# Patient Record
Sex: MALE
[De-identification: names, ages, dates, MRNs, and addresses within clinical notes are randomized per-mention and may not be internally consistent; named-entity substitution may affect disease eponyms.]

---
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
## 2019-01-01 ENCOUNTER — EXTERNAL RECORD (OUTPATIENT)
Dept: HEALTH INFORMATION MANAGEMENT | Facility: OTHER | Age: 19
End: 2019-01-01

## 2019-01-02 ENCOUNTER — PATIENT OUTREACH (OUTPATIENT)
Dept: CASE MANAGEMENT | Age: 19
End: 2019-01-02

## 2019-01-02 NOTE — PROGRESS NOTES
LM on pt ceel phone [phone] and asked him to call me back for [facility] call.

## 2019-03-05 ENCOUNTER — TELEPHONE (OUTPATIENT)
Dept: FAMILY MEDICINE CLINIC | Facility: CLINIC | Age: 19
End: 2019-03-05

## 2019-03-06 NOTE — TELEPHONE ENCOUNTER
Future Appointments   Date Time Provider Dieudonne Mandujano   3/16/2019  8:45 AM [facility] [place] NURSE [facility] [place]   4/12/2019 11:30 AM Mike Crawford MD [address]     Please advise. Pt due for a full set of labs at this time?

## 2019-03-15 ENCOUNTER — TELEPHONE (OUTPATIENT)
Dept: FAMILY MEDICINE CLINIC | Facility: CLINIC | Age: 19
End: 2019-03-15

## 2019-03-15 DIAGNOSIS — Z00.129 WELL ADOLESCENT VISIT: Primary | ICD-10-CM

## 2019-03-15 NOTE — TELEPHONE ENCOUNTER
Your Appointments    Saturday March 16, 2019  8:45 AM CDT  Nurse Only with [facility] [address] [place] [address] ([address]) [address] [place] [address],[address] [place] [address]   Friday April 12, 2019 11:30 AM C

## 2019-03-16 ENCOUNTER — NURSE ONLY (OUTPATIENT)
Dept: FAMILY MEDICINE CLINIC | Facility: CLINIC | Age: 19
End: 2019-03-16

## 2019-03-16 DIAGNOSIS — Z00.129 WELL ADOLESCENT VISIT: ICD-10-CM

## 2019-03-16 LAB
ALBUMIN SERPL-MCNC: 3.8 G/DL (ref 3.4–5)
ALBUMIN/GLOB SERPL: 1 {RATIO} (ref 1–2)
ALP LIVER SERPL-CCNC: 100 U/L (ref 45–117)
ALT SERPL-CCNC: 35 U/L (ref 16–61)
ANION GAP SERPL CALC-SCNC: 7 MMOL/L (ref 0–18)
AST SERPL-CCNC: 21 U/L (ref 15–37)
BASOPHILS # BLD AUTO: 0.03 X10(3) UL (ref 0–0.2)
BASOPHILS NFR BLD AUTO: 0.4 %
BILIRUB SERPL-MCNC: 0.4 MG/DL (ref 0.1–2)
BUN BLD-MCNC: 9 MG/DL (ref 7–18)
BUN/CREAT SERPL: 12.2 (ref 10–20)
CALCIUM BLD-MCNC: 8.8 MG/DL (ref 8.5–10.1)
CHLORIDE SERPL-SCNC: 104 MMOL/L (ref 98–107)
CHOLEST SMN-MCNC: 167 MG/DL (ref ?–200)
CO2 SERPL-SCNC: 26 MMOL/L (ref 21–32)
CREAT BLD-MCNC: 0.74 MG/DL (ref 0.7–1.3)
DEPRECATED RDW RBC AUTO: 39.4 FL (ref 35.1–46.3)
EOSINOPHIL # BLD AUTO: 0.15 X10(3) UL (ref 0–0.7)
EOSINOPHIL NFR BLD AUTO: 2.2 %
ERYTHROCYTE [DISTWIDTH] IN BLOOD BY AUTOMATED COUNT: 12.4 % (ref 11–15)
GLOBULIN PLAS-MCNC: 3.9 G/DL (ref 2.8–4.4)
GLUCOSE BLD-MCNC: 111 MG/DL (ref 70–99)
HCT VFR BLD AUTO: 40.4 % (ref 39–53)
HDLC SERPL-MCNC: 21 MG/DL (ref 40–59)
HGB BLD-MCNC: 13.4 G/DL (ref 13–17.5)
IMM GRANULOCYTES # BLD AUTO: 0.07 X10(3) UL (ref 0–1)
IMM GRANULOCYTES NFR BLD: 1 %
LDLC SERPL DIRECT ASSAY-MCNC: 89 MG/DL (ref ?–100)
LYMPHOCYTES # BLD AUTO: 1.97 X10(3) UL (ref 1.5–5)
LYMPHOCYTES NFR BLD AUTO: 29 %
M PROTEIN MFR SERPL ELPH: 7.7 G/DL (ref 6.4–8.2)
MCH RBC QN AUTO: 28.9 PG (ref 26–34)
MCHC RBC AUTO-ENTMCNC: 33.2 G/DL (ref 31–37)
MCV RBC AUTO: 87.1 FL (ref 80–100)
MONOCYTES # BLD AUTO: 0.58 X10(3) UL (ref 0.1–1)
MONOCYTES NFR BLD AUTO: 8.5 %
NEUTROPHILS # BLD AUTO: 4 X10 (3) UL (ref 1.5–7.7)
NEUTROPHILS # BLD AUTO: 4 X10(3) UL (ref 1.5–7.7)
NEUTROPHILS NFR BLD AUTO: 58.9 %
NONHDLC SERPL-MCNC: 146 MG/DL (ref ?–130)
OSMOLALITY SERPL CALC.SUM OF ELEC: 283 MOSM/KG (ref 275–295)
PLATELET # BLD AUTO: 151 10(3)UL (ref 150–450)
POTASSIUM SERPL-SCNC: 3.7 MMOL/L (ref 3.5–5.1)
RBC # BLD AUTO: 4.64 X10(6)UL (ref 4.3–5.7)
SODIUM SERPL-SCNC: 137 MMOL/L (ref 136–145)
TRIGL SERPL-MCNC: 722 MG/DL (ref 30–149)
TSI SER-ACNC: 7.89 MIU/ML (ref 0.36–3.74)
VIT D+METAB SERPL-MCNC: 15.9 NG/ML (ref 30–100)
WBC # BLD AUTO: 6.8 X10(3) UL (ref 4–11)

## 2019-03-16 PROCEDURE — 83721 ASSAY OF BLOOD LIPOPROTEIN: CPT | Performed by: FAMILY MEDICINE

## 2019-03-16 PROCEDURE — 36415 COLL VENOUS BLD VENIPUNCTURE: CPT | Performed by: FAMILY MEDICINE

## 2019-03-16 PROCEDURE — 80061 LIPID PANEL: CPT | Performed by: FAMILY MEDICINE

## 2019-03-16 PROCEDURE — 84443 ASSAY THYROID STIM HORMONE: CPT | Performed by: FAMILY MEDICINE

## 2019-03-16 PROCEDURE — 85025 COMPLETE CBC W/AUTO DIFF WBC: CPT | Performed by: FAMILY MEDICINE

## 2019-03-16 PROCEDURE — 82306 VITAMIN D 25 HYDROXY: CPT | Performed by: FAMILY MEDICINE

## 2019-03-16 PROCEDURE — 80053 COMPREHEN METABOLIC PANEL: CPT | Performed by: FAMILY MEDICINE

## 2019-03-18 ENCOUNTER — TELEPHONE (OUTPATIENT)
Dept: FAMILY MEDICINE CLINIC | Facility: CLINIC | Age: 19
End: 2019-03-18

## 2019-03-18 NOTE — TELEPHONE ENCOUNTER
Pt's mom called stated his lab results have been released to My Chart but they have not received a call.

## 2019-03-18 NOTE — TELEPHONE ENCOUNTER
Patient's mother advised and verbalized understanding. Appointment scheduled.   Future Appointments   Date Time Provider Dieudonne Nazia   3/21/2019  1:40 PM Rosalio Echavarria DO [facility]   4/12/2019 11:30 AM Daksha Stewart  [address]

## 2019-03-28 ENCOUNTER — OFFICE VISIT (OUTPATIENT)
Dept: FAMILY MEDICINE CLINIC | Facility: CLINIC | Age: 19
End: 2019-03-28

## 2019-03-28 VITALS
TEMPERATURE: 98 F | RESPIRATION RATE: 20 BRPM | OXYGEN SATURATION: 98 % | DIASTOLIC BLOOD PRESSURE: 75 MMHG | BODY MASS INDEX: 34 KG/M2 | HEART RATE: 76 BPM | SYSTOLIC BLOOD PRESSURE: 111 MMHG | WEIGHT: 196.38 LBS

## 2019-03-28 DIAGNOSIS — R73.09 ABNORMAL GLUCOSE: Primary | ICD-10-CM

## 2019-03-28 DIAGNOSIS — R10.811 RIGHT UPPER QUADRANT ABDOMINAL TENDERNESS WITHOUT REBOUND TENDERNESS: ICD-10-CM

## 2019-03-28 DIAGNOSIS — R94.6 ABNORMAL THYROID FUNCTION TEST: ICD-10-CM

## 2019-03-28 DIAGNOSIS — E78.1 HYPERTRIGLYCERIDEMIA: ICD-10-CM

## 2019-03-28 LAB
EST. AVERAGE GLUCOSE BLD GHB EST-MCNC: 108 MG/DL (ref 68–126)
HBA1C MFR BLD HPLC: 5.4 % (ref ?–5.7)
T4 FREE SERPL-MCNC: 0.8 NG/DL (ref 0.9–1.6)
T4 SERPL-MCNC: 7.3 UG/DL (ref 4.5–12.1)
THYROGLOB SERPL-MCNC: <15 U/ML (ref ?–60)
THYROPEROXIDASE AB SERPL-ACNC: 36 U/ML (ref ?–60)
TSI SER-ACNC: 1.51 MIU/ML (ref 0.36–3.74)

## 2019-03-28 PROCEDURE — 86376 MICROSOMAL ANTIBODY EACH: CPT | Performed by: FAMILY MEDICINE

## 2019-03-28 PROCEDURE — 84443 ASSAY THYROID STIM HORMONE: CPT | Performed by: FAMILY MEDICINE

## 2019-03-28 PROCEDURE — 83036 HEMOGLOBIN GLYCOSYLATED A1C: CPT | Performed by: FAMILY MEDICINE

## 2019-03-28 PROCEDURE — 99213 OFFICE O/P EST LOW 20 MIN: CPT | Performed by: FAMILY MEDICINE

## 2019-03-28 PROCEDURE — 36415 COLL VENOUS BLD VENIPUNCTURE: CPT | Performed by: FAMILY MEDICINE

## 2019-03-28 PROCEDURE — 84439 ASSAY OF FREE THYROXINE: CPT | Performed by: FAMILY MEDICINE

## 2019-03-28 PROCEDURE — 86800 THYROGLOBULIN ANTIBODY: CPT | Performed by: FAMILY MEDICINE

## 2019-03-28 NOTE — PROGRESS NOTES
CC: follow up - abnormal labs    HPI:     The glucose elevated. TG's markedly elevated. He is drinking a lot of pop.     Thyroid studies abnormal.     ROS:     SKIN: denies any unusual skin lesions or rashes  RESPIRATORY: denies shortness of breath with

## 2019-05-06 ENCOUNTER — OFFICE VISIT (OUTPATIENT)
Dept: FAMILY MEDICINE | Age: 19
End: 2019-05-06

## 2019-05-06 VITALS
SYSTOLIC BLOOD PRESSURE: 118 MMHG | WEIGHT: 196 LBS | HEIGHT: 62 IN | RESPIRATION RATE: 16 BRPM | DIASTOLIC BLOOD PRESSURE: 80 MMHG | HEART RATE: 88 BPM | BODY MASS INDEX: 36.07 KG/M2 | OXYGEN SATURATION: 98 %

## 2019-05-06 DIAGNOSIS — E88.819 INSULIN RESISTANCE: ICD-10-CM

## 2019-05-06 DIAGNOSIS — E61.1 IRON DEFICIENCY: ICD-10-CM

## 2019-05-06 DIAGNOSIS — E03.8 SUBCLINICAL HYPOTHYROIDISM: ICD-10-CM

## 2019-05-06 DIAGNOSIS — Z91.89 RISK OF EXPOSURE TO LYME DISEASE: ICD-10-CM

## 2019-05-06 DIAGNOSIS — E61.2 MAGNESIUM DEFICIENCY: ICD-10-CM

## 2019-05-06 DIAGNOSIS — E55.9 VITAMIN D DEFICIENCY: ICD-10-CM

## 2019-05-06 DIAGNOSIS — E53.8 B12 DEFICIENCY: ICD-10-CM

## 2019-05-06 DIAGNOSIS — R41.82 ALTERED MENTAL STATUS, UNSPECIFIED ALTERED MENTAL STATUS TYPE: Primary | ICD-10-CM

## 2019-05-06 PROCEDURE — 99213 OFFICE O/P EST LOW 20 MIN: CPT | Performed by: FAMILY MEDICINE

## 2019-05-06 ASSESSMENT — PATIENT HEALTH QUESTIONNAIRE - PHQ9
1. LITTLE INTEREST OR PLEASURE IN DOING THINGS: NOT AT ALL
SUM OF ALL RESPONSES TO PHQ9 QUESTIONS 1 AND 2: 0
SUM OF ALL RESPONSES TO PHQ9 QUESTIONS 1 AND 2: 0
2. FEELING DOWN, DEPRESSED OR HOPELESS: NOT AT ALL

## 2019-05-06 ASSESSMENT — ENCOUNTER SYMPTOMS
HEMATOLOGIC/LYMPHATIC NEGATIVE: 1
PSYCHIATRIC NEGATIVE: 1
ALLERGIC/IMMUNOLOGIC NEGATIVE: 1
GASTROINTESTINAL NEGATIVE: 1
EYES NEGATIVE: 1
ENDOCRINE NEGATIVE: 1
RESPIRATORY NEGATIVE: 1
CONSTITUTIONAL NEGATIVE: 1
NEUROLOGICAL NEGATIVE: 1

## 2019-05-11 ENCOUNTER — TELEPHONE (OUTPATIENT)
Dept: FAMILY MEDICINE | Age: 19
End: 2019-05-11

## 2019-05-11 ENCOUNTER — LAB SERVICES (OUTPATIENT)
Dept: LAB | Age: 19
End: 2019-05-11

## 2019-05-11 DIAGNOSIS — Z91.89 RISK OF EXPOSURE TO LYME DISEASE: ICD-10-CM

## 2019-05-11 DIAGNOSIS — E61.2 MAGNESIUM DEFICIENCY: ICD-10-CM

## 2019-05-11 DIAGNOSIS — R41.82 ALTERED MENTAL STATUS, UNSPECIFIED ALTERED MENTAL STATUS TYPE: Primary | ICD-10-CM

## 2019-05-11 DIAGNOSIS — E03.8 SUBCLINICAL HYPOTHYROIDISM: ICD-10-CM

## 2019-05-11 DIAGNOSIS — E88.819 INSULIN RESISTANCE: ICD-10-CM

## 2019-05-11 DIAGNOSIS — R46.89 BEHAVIORAL CHANGE: Primary | ICD-10-CM

## 2019-05-11 DIAGNOSIS — E61.1 IRON DEFICIENCY: ICD-10-CM

## 2019-05-11 DIAGNOSIS — E53.8 VITAMIN B12 DEFICIENCY: ICD-10-CM

## 2019-05-11 DIAGNOSIS — E55.9 VITAMIN D DEFICIENCY: ICD-10-CM

## 2019-05-11 LAB
25(OH)D3 SERPL-MCNC: 21.6 NG/ML (ref 30–100)
IRON SATN MFR SERPL: 36 % (ref 13–59)
IRON SERPL-MCNC: 124 UG/DL (ref 49–181)
T3FREE SERPL-MCNC: 4.5 PG/ML (ref 2.8–5.3)
T4 FREE SERPL-MCNC: 0.72 NG/DL (ref 0.78–2.19)
TIBC SERPL-MCNC: 342 UG/DL (ref 250–450)
TSH SERPL DL<=0.05 MIU/L-ACNC: 2 M[IU]/L (ref 0.3–4.82)
VIT B12 SERPL-MCNC: 543 PG/ML (ref 193–982)

## 2019-05-11 PROCEDURE — 84443 ASSAY THYROID STIM HORMONE: CPT | Performed by: FAMILY MEDICINE

## 2019-05-11 PROCEDURE — 87798 DETECT AGENT NOS DNA AMP: CPT | Performed by: FAMILY MEDICINE

## 2019-05-11 PROCEDURE — 36415 COLL VENOUS BLD VENIPUNCTURE: CPT | Performed by: FAMILY MEDICINE

## 2019-05-11 PROCEDURE — 83550 IRON BINDING TEST: CPT | Performed by: FAMILY MEDICINE

## 2019-05-11 PROCEDURE — 83525 ASSAY OF INSULIN: CPT | Performed by: FAMILY MEDICINE

## 2019-05-11 PROCEDURE — 84439 ASSAY OF FREE THYROXINE: CPT | Performed by: FAMILY MEDICINE

## 2019-05-11 PROCEDURE — 86617 LYME DISEASE ANTIBODY: CPT | Performed by: FAMILY MEDICINE

## 2019-05-11 PROCEDURE — 83735 ASSAY OF MAGNESIUM: CPT | Performed by: FAMILY MEDICINE

## 2019-05-11 PROCEDURE — 82306 VITAMIN D 25 HYDROXY: CPT | Performed by: FAMILY MEDICINE

## 2019-05-11 PROCEDURE — 82607 VITAMIN B-12: CPT | Performed by: FAMILY MEDICINE

## 2019-05-11 PROCEDURE — 86753 PROTOZOA ANTIBODY NOS: CPT | Performed by: FAMILY MEDICINE

## 2019-05-11 PROCEDURE — 83540 ASSAY OF IRON: CPT | Performed by: FAMILY MEDICINE

## 2019-05-11 PROCEDURE — 87471 BARTONELLA DNA AMP PROBE: CPT | Performed by: FAMILY MEDICINE

## 2019-05-11 PROCEDURE — 84481 FREE ASSAY (FT-3): CPT | Performed by: FAMILY MEDICINE

## 2019-05-12 LAB — INSULIN SERPL-ACNC: 56 MUNITS/L

## 2019-05-13 LAB — FAX RESULTS: NORMAL

## 2019-05-14 LAB — MAGNESIUM RBC-MCNC: 5.3 MG/DL (ref 4–6.5)

## 2019-05-15 LAB
A PHAGOCYTOPH DNA BLD QL NAA+PROBE: NEGATIVE
E CHAFFEENSIS DNA BLD QL NAA+PROBE: NEGATIVE
E EWINGII DNA SPEC QL NAA+PROBE: NEGATIVE
EHRLICHIA DNA SPEC QL NAA+PROBE: NEGATIVE

## 2019-05-16 LAB
B BURGDOR IGG PATRN SER IB-IMP: NORMAL
B BURGDOR IGG SER QL IB: NEGATIVE
B BURGDOR IGG+IGM SER IB-IMP: NORMAL
B BURGDOR IGM PATRN SER IB-IMP: NORMAL
B BURGDOR IGM SER QL IB: NEGATIVE
B MICROTI IGG TITR SER: NORMAL {TITER}
B MICROTI IGM TITR SER: NORMAL {TITER}

## 2019-05-17 ENCOUNTER — TELEPHONE (OUTPATIENT)
Dept: INTERNAL MEDICINE | Age: 19
End: 2019-05-17

## 2019-05-20 DIAGNOSIS — E88.819 INSULIN RESISTANCE: Primary | ICD-10-CM

## 2019-05-20 DIAGNOSIS — R79.89 LOW T4: ICD-10-CM

## 2019-05-20 DIAGNOSIS — E61.2 MAGNESIUM DEFICIENCY: ICD-10-CM

## 2019-05-20 DIAGNOSIS — R73.09 ELEVATED GLYCOHEMOGLOBIN: ICD-10-CM

## 2019-05-20 DIAGNOSIS — E03.8 SUBCLINICAL HYPOTHYROIDISM: ICD-10-CM

## 2019-05-21 LAB
MISCELLANEOUS #1: NORMAL
MISCELLANEOUS #1: NORMAL

## 2019-07-08 ENCOUNTER — TELEPHONE (OUTPATIENT)
Dept: FAMILY MEDICINE CLINIC | Facility: CLINIC | Age: 19
End: 2019-07-08

## 2019-07-08 DIAGNOSIS — M21.41 PES PLANUS OF BOTH FEET: Primary | ICD-10-CM

## 2019-07-08 DIAGNOSIS — M21.42 PES PLANUS OF BOTH FEET: Primary | ICD-10-CM

## 2019-07-09 ENCOUNTER — TELEPHONE (OUTPATIENT)
Dept: PODIATRY CLINIC | Facility: CLINIC | Age: 19
End: 2019-07-09

## 2019-07-09 NOTE — TELEPHONE ENCOUNTER
See if the patient can come in tomorrow so I can tell you if not see if they can see Dr. Jessee Perez or possibly Dr. Cait Lopez

## 2019-07-09 NOTE — TELEPHONE ENCOUNTER
Pts mother states pts left toe looks purple, states there is blood and it is oozing, states pt neglected to tell her about toe situation and is only getting worse, asking for appt soon, states she is familiar with [facility] because her other son is a pt of his,

## 2019-07-09 NOTE — TELEPHONE ENCOUNTER
RAIZATCLEDY on mother's phone. Advised that [facility] suggested appt tomorrow in [place].

## 2019-07-09 NOTE — TELEPHONE ENCOUNTER
LMTCB to clarify original message and ask f/u questions re toe and to schedule appt. WMN - see note and advise. First available appt is 7/17/19 in [place].  Based on current available information, would you like to see sooner (double book tomorrow in [facility]

## 2019-07-15 NOTE — TELEPHONE ENCOUNTER
S/w pt's mother. They have not gone to urgent care. Bilateral hallux bleeding, purple, swollen on pt. appt made for 7/16/19 1345 with [facility]. Patient repeated back time and location of appt, verbalized understanding of plan.

## 2019-07-16 ENCOUNTER — OFFICE VISIT (OUTPATIENT)
Dept: PODIATRY CLINIC | Facility: CLINIC | Age: 19
End: 2019-07-16

## 2019-07-16 DIAGNOSIS — M79.675 PAIN IN TOES OF BOTH FEET: ICD-10-CM

## 2019-07-16 DIAGNOSIS — L03.032 PARONYCHIA OF TOE OF LEFT FOOT: ICD-10-CM

## 2019-07-16 DIAGNOSIS — L60.0 ONYCHOCRYPTOSIS: Primary | ICD-10-CM

## 2019-07-16 DIAGNOSIS — M79.674 PAIN IN TOES OF BOTH FEET: ICD-10-CM

## 2019-07-16 DIAGNOSIS — L03.031 PARONYCHIA OF TOE OF RIGHT FOOT: ICD-10-CM

## 2019-07-16 PROCEDURE — 99203 OFFICE O/P NEW LOW 30 MIN: CPT | Performed by: PODIATRIST

## 2019-07-16 RX ORDER — AZITHROMYCIN 250 MG/1
TABLET, FILM COATED ORAL
Qty: 1 PACKAGE | Refills: 0 | Status: SHIPPED | OUTPATIENT
Start: 2019-07-16 | End: 2019-09-08

## 2019-07-16 NOTE — PROGRESS NOTES
Tony Aiken is a 22year old male. Patient presents with:  Ingrown Toenail: Bilateral big toes -has redness andf drainage, has pain with touch rated as 7-8/10         HPI:   This pleasant patient presents to the clinic his mother is present with him.   He Testicular cancer   • Anxiety Mother    • Depression Mother    • Anxiety Maternal Grandmother    • Depression Maternal Grandmother    • Depression Maternal Grandfather    • Anxiety Maternal Grandfather    • Anxiety Sister       Social History    Soc of both feet    Other orders  -     mupirocin 2 % External Ointment; Apply a small amount to the affected nail edge twice daily after soaking and cover with a Band-Aid,  -     azithromycin (ZITHROMAX Z-ROSI) 250 MG Oral Tab;  Take two tablets today to start

## 2019-07-22 ENCOUNTER — TELEPHONE (OUTPATIENT)
Dept: FAMILY MEDICINE CLINIC | Facility: CLINIC | Age: 19
End: 2019-07-22

## 2019-07-22 ENCOUNTER — TELEPHONE (OUTPATIENT)
Dept: PODIATRY CLINIC | Facility: CLINIC | Age: 19
End: 2019-07-22

## 2019-07-22 DIAGNOSIS — M21.41 PES PLANUS OF BOTH FEET: Primary | ICD-10-CM

## 2019-07-22 DIAGNOSIS — M21.42 PES PLANUS OF BOTH FEET: Primary | ICD-10-CM

## 2019-07-22 NOTE — TELEPHONE ENCOUNTER
Mom notified. I did let her know if the podiatry office has questions, they can certainly contact our office to discuss.

## 2019-07-22 NOTE — TELEPHONE ENCOUNTER
Pt has procedure tomorrow for ingrown toenail. Pt has HMO ins and PCP put in ref for diagnosis of flat foot. Pts mother asking if ref is okay.  pls advise thank you

## 2019-07-22 NOTE — TELEPHONE ENCOUNTER
LMTCB    Referral placed for Dr. Hola Shanks - it is up to podiatrist office to get procedure approved. Referral is placed for Office visit. As long as referral is placed their office may place referral for procedure.

## 2019-07-22 NOTE — TELEPHONE ENCOUNTER
Stated Dr. Liliane Euceda office having problem with referral.  Stated pt is having procedure for ingrown toe nail, not flat feet.

## 2019-07-22 NOTE — TELEPHONE ENCOUNTER
Spoke to mother of pt and pt is only being seen by [facility] for ingrown toenails. Advised mother to call PCP office to have referral changed to ingrown toenail for reason for visit. Mother verbalized understanding.

## 2019-07-22 NOTE — TELEPHONE ENCOUNTER
Pt's mom called stated her son is having a procedure tormorrow and they need 2 referrals. One for the procedure and one for the f/u. This is with Dr. Mark Lancaster.

## 2019-07-23 ENCOUNTER — OFFICE VISIT (OUTPATIENT)
Dept: PODIATRY CLINIC | Facility: CLINIC | Age: 19
End: 2019-07-23

## 2019-07-23 VITALS — HEART RATE: 68 BPM | DIASTOLIC BLOOD PRESSURE: 83 MMHG | SYSTOLIC BLOOD PRESSURE: 117 MMHG | RESPIRATION RATE: 16 BRPM

## 2019-07-23 DIAGNOSIS — L60.0 ONYCHOCRYPTOSIS: Primary | ICD-10-CM

## 2019-07-23 DIAGNOSIS — M79.674 PAIN IN TOES OF BOTH FEET: ICD-10-CM

## 2019-07-23 DIAGNOSIS — L03.031 PARONYCHIA OF TOE OF RIGHT FOOT: ICD-10-CM

## 2019-07-23 DIAGNOSIS — L03.032 PARONYCHIA OF TOE OF LEFT FOOT: ICD-10-CM

## 2019-07-23 DIAGNOSIS — M79.675 PAIN IN TOES OF BOTH FEET: ICD-10-CM

## 2019-07-23 PROCEDURE — 11750 EXCISION NAIL&NAIL MATRIX: CPT | Performed by: PODIATRIST

## 2019-07-23 RX ORDER — DOCUSATE SODIUM 100 MG/1
100 CAPSULE, LIQUID FILLED ORAL
Status: ON HOLD | COMMUNITY
Start: 2019-06-20 | End: 2021-08-23

## 2019-07-23 NOTE — PROGRESS NOTES
Per Dr. Mark Lancaster, draw up  Two syringes of 5 cc of 0.5 % Marcaine for injection to bilateral hallux.  KP

## 2019-07-23 NOTE — PROGRESS NOTES
Mark Durand is a 22year old male. Patient presents with:  Procedure: bilateral toenail procedure - pt denies toe pain today. HPI:   Patient returns the clinic with his mother present for toenail procedures.   At today's visit reviewed nurse's hist Mother    • Anxiety Maternal Grandmother    • Depression Maternal Grandmother    • Depression Maternal Grandfather    • Anxiety Maternal Grandfather    • Anxiety Sister       Social History    Socioeconomic History      Marital status: Single      Spouse n lateral nail border of the hallux bilateral feet    Technique: Appropriate timeout was taken. The hallux bilateral feet were anesthetized with 5 cc per hallux of 0.5% Marcaine plain then prepped and draped using usual aseptic technique.   Hemostasis was ac

## 2019-09-08 ENCOUNTER — HOSPITAL ENCOUNTER (OUTPATIENT)
Age: 19
Discharge: HOME | End: 2019-09-08
Attending: FAMILY MEDICINE
Payer: COMMERCIAL

## 2019-09-08 VITALS
TEMPERATURE: 100 F | OXYGEN SATURATION: 97 % | DIASTOLIC BLOOD PRESSURE: 79 MMHG | HEART RATE: 100 BPM | SYSTOLIC BLOOD PRESSURE: 125 MMHG | RESPIRATION RATE: 16 BRPM

## 2019-09-08 DIAGNOSIS — J01.90 ACUTE SINUSITIS, RECURRENCE NOT SPECIFIED, UNSPECIFIED LOCATION: Primary | ICD-10-CM

## 2019-09-08 DIAGNOSIS — J11.1 FLU SYNDROME: ICD-10-CM

## 2019-09-08 PROCEDURE — 99214 OFFICE O/P EST MOD 30 MIN: CPT

## 2019-09-08 PROCEDURE — 99213 OFFICE O/P EST LOW 20 MIN: CPT

## 2019-09-08 RX ORDER — AZITHROMYCIN 250 MG/1
TABLET, FILM COATED ORAL
Qty: 6 TABLET | Refills: 0 | Status: SHIPPED | OUTPATIENT
Start: 2019-09-08 | End: 2019-09-14

## 2019-09-08 NOTE — ED PROVIDER NOTES
Patient Seen in: [address] [facility]    History   Patient presents with:  Sinusitis    Stated Complaint: Sore Throat, Fever, congestion, cough    HPI    79-year-old male presents to the immediate care today with chief complaints of sinus pres (37.6 °C) (Temporal)   Resp 16   SpO2 97%         Physical Exam      General appearance: alert, appears stated age and cooperative. Moderately ill appearing. Head: Normocephalic, without obvious abnormality, atraumatic  Eyes: conjunctivae/corneas clear.  P

## 2019-09-08 NOTE — ED INITIAL ASSESSMENT (HPI)
Pt states he has had sinus congestion and a runny nose with post nasal drip for a few days. Last night had a fever, headache and a slight sore throat. States had to work all day and now feels run down and achy. Last tylenol at 0230.  States when he blows hi

## 2019-09-13 ENCOUNTER — TELEPHONE (OUTPATIENT)
Dept: FAMILY MEDICINE CLINIC | Facility: CLINIC | Age: 19
End: 2019-09-13

## 2019-09-13 NOTE — TELEPHONE ENCOUNTER
Mom has an appt tomorrow @ [phone],  Wants to know if she can bring pt with her? Pt was seen @ [facility] 5 days ago for Sinusitis,  Needs to f/u with PCP, still not feeling well. Please advise.

## 2019-09-13 NOTE — TELEPHONE ENCOUNTER
Patient's mother advised and verbalized understanding. Appointment scheduled.   Future Appointments   Date Time Provider Dieudonne Mandujano   9/14/2019 11:30 AM DO CASSY King [facility]

## 2019-09-13 NOTE — TELEPHONE ENCOUNTER
Mom requesting to bring patient to her appointment tomorrow at 11:45  No available openings  Patient see in [address] [place] [address] for sinusitis 9/8/19 - still not feeling better please advise.

## 2019-09-14 ENCOUNTER — OFFICE VISIT (OUTPATIENT)
Dept: FAMILY MEDICINE CLINIC | Facility: CLINIC | Age: 19
End: 2019-09-14

## 2019-09-14 VITALS
OXYGEN SATURATION: 96 % | TEMPERATURE: 98 F | DIASTOLIC BLOOD PRESSURE: 70 MMHG | SYSTOLIC BLOOD PRESSURE: 102 MMHG | BODY MASS INDEX: 34.49 KG/M2 | WEIGHT: 202 LBS | RESPIRATION RATE: 20 BRPM | HEIGHT: 64 IN | HEART RATE: 90 BPM

## 2019-09-14 DIAGNOSIS — R05.9 COUGH: Primary | ICD-10-CM

## 2019-09-14 PROCEDURE — 99213 OFFICE O/P EST LOW 20 MIN: CPT | Performed by: FAMILY MEDICINE

## 2019-09-16 NOTE — PROGRESS NOTES
CC: Cough    HPI:     Several days of cough. No sputum or hemoptysis. Recently treated with antibiotics. ROS: Initially had fever seems to have resolved.       Current Outpatient Medications:  Albuterol Sulfate 108 (90 Base) MCG/ACT Inhalation Aerosol Imaging & Consults:  None

## 2019-10-10 ENCOUNTER — HOSPITAL ENCOUNTER (OUTPATIENT)
Age: 19
Discharge: HOME | End: 2019-10-10
Attending: EMERGENCY MEDICINE
Payer: COMMERCIAL

## 2019-10-10 VITALS
RESPIRATION RATE: 20 BRPM | HEART RATE: 67 BPM | SYSTOLIC BLOOD PRESSURE: 125 MMHG | DIASTOLIC BLOOD PRESSURE: 80 MMHG | TEMPERATURE: 98 F | OXYGEN SATURATION: 98 %

## 2019-10-10 DIAGNOSIS — L02.91 CUTANEOUS ABSCESS, UNSPECIFIED SITE: Primary | ICD-10-CM

## 2019-10-10 PROCEDURE — 99212 OFFICE O/P EST SF 10 MIN: CPT

## 2019-10-10 NOTE — ED INITIAL ASSESSMENT (HPI)
Abscess to right groin area for 4 days,\"It's been rubbing a lot at work, increasing my pain, there is a little cyst there. \" no fevers.

## 2019-10-10 NOTE — ED PROVIDER NOTES
Patient Seen in: [address] [facility]      History   Patient presents with:  Abscess (integumentary)    Stated Complaint: groin area pain/lower back pain    HPI    Patient is a pleasant 24-year-old male presents with skin lesion to his right s Moist mucous membranes  Lungs: No tachypnea  Cardiac: No tachycardia  : There is a superficial folliculitis of the right scrotum. Skin: No rashes, pallor  Neuro: No focal deficits.   Extremities: No clubbing/cyanosis/edema    ED Course   Labs Reviewed -

## 2019-11-14 PROBLEM — E66.9 OBESITY: Status: ACTIVE | Noted: 2019-11-14

## 2019-11-14 PROBLEM — J45.909 ASTHMA (HCC): Status: ACTIVE | Noted: 2019-11-14

## 2019-11-14 PROBLEM — J45.909 ASTHMA: Status: ACTIVE | Noted: 2019-11-14

## 2019-11-14 NOTE — ED NOTES
Spoke to Anders at [facility], pt may be direct admit.  Petition and certificate faxed to [facility]

## 2019-11-14 NOTE — ED NOTES
Waiting for disposition from [facility], [facility] [place]

## 2019-11-14 NOTE — ED NOTES
Called [facility] again, informed accepted by Dr Duane Bathgate to room 823B.  Attempted to call report, no answer

## 2019-11-14 NOTE — ED PROVIDER NOTES
Patient Seen in: [facility] Emergency [facility] In [place]      History   Patient presents with:  Eval-P (psychiatric)    Stated Complaint: depression    HPI    Patient is a friendly 79-year-old male brought here by his father with concerns about suicida Cardiovascular: Normal rate and regular rhythm. Pulmonary/Chest: Effort normal and breath sounds normal. No stridor. Abdominal: Soft. There is no tenderness. There is no guarding. Musculoskeletal: Exhibits no edema or tenderness.      Neurologi

## 2019-11-14 NOTE — ED PROVIDER NOTES
Patient is a 26-year-old male with history of bipolar disorder who presented to the emergency department with concerns for suicidal ideation. Patient was initially evaluated by my partner, please refer to their documentation for additional details.     H

## 2019-11-14 NOTE — ED INITIAL ASSESSMENT (HPI)
States he feels depressed over the past few days after break up with girlfriend . Has been drinking Vodka today, + marijuana. Pt states he has suicidal thoughts with no definite plan and told his mother he needed help.  Has hx of bipolar and no meds because

## 2019-11-14 NOTE — ED NOTES
To [facility] via [facility] in stable condition with all belongings to medics.   Pt called mother prior to leaving

## 2019-11-19 ENCOUNTER — PATIENT OUTREACH (OUTPATIENT)
Dept: CASE MANAGEMENT | Age: 19
End: 2019-11-19

## 2019-11-19 NOTE — PROGRESS NOTES
Called patient's only # at [phone] to offer CM and one of his \"good friends\" answered. Told him I would call back.

## 2019-11-19 NOTE — PROGRESS NOTES
Danielaied calling patient at his # [phone]. A male voice answered but he denied being [place]. Stated it was one of his \"good friends. \" I told him I will call at a later time.

## 2019-11-20 NOTE — PROGRESS NOTES
Left voice mail on number listed below and asked him to call back for [facility] call.

## 2019-12-02 ENCOUNTER — TELEPHONE (OUTPATIENT)
Dept: FAMILY MEDICINE CLINIC | Facility: CLINIC | Age: 19
End: 2019-12-02

## 2019-12-02 NOTE — TELEPHONE ENCOUNTER
No appt scheduled with [facility]. Per pts mom, it will likely be 2 weeks before pt can see an NP. If pt needs to see a Psychiatrist, it will be January - March until he is seen. They are waiting on a CB from [facility] at this point.

## 2019-12-02 NOTE — TELEPHONE ENCOUNTER
Patient's mom advised. Verbalized understanding. States she is doing her best to get patient scheduled with MD or NP. States someone from [facility] is supposed to be calling her back.  States her son saw psych there are was not comfortable with the psychi

## 2019-12-02 NOTE — TELEPHONE ENCOUNTER
Call from patient's mom. Ok per [employer] form. Patient was d/c from [facility] about 2 weeks ago. Was told to f/u with Psych for refill on seroquel. States psych is booked until next year for appts. Trying to get patient in to see a NP at that office.  IN th

## 2020-01-28 PROBLEM — F12.20 CANNABIS USE DISORDER, SEVERE, DEPENDENCE (HCC): Status: ACTIVE | Noted: 2020-01-28

## 2020-01-28 PROBLEM — F10.20 ALCOHOL USE DISORDER, MODERATE, DEPENDENCE (HCC): Status: ACTIVE | Noted: 2020-01-28

## 2020-02-27 ENCOUNTER — OFFICE VISIT (OUTPATIENT)
Dept: FAMILY MEDICINE CLINIC | Facility: CLINIC | Age: 20
End: 2020-02-27

## 2020-02-27 VITALS
HEART RATE: 97 BPM | DIASTOLIC BLOOD PRESSURE: 70 MMHG | BODY MASS INDEX: 35.4 KG/M2 | RESPIRATION RATE: 18 BRPM | WEIGHT: 204.81 LBS | HEIGHT: 63.8 IN | SYSTOLIC BLOOD PRESSURE: 110 MMHG | OXYGEN SATURATION: 96 % | TEMPERATURE: 98 F

## 2020-02-27 DIAGNOSIS — Z00.00 GENERAL MEDICAL EXAM: Primary | ICD-10-CM

## 2020-02-27 DIAGNOSIS — Z28.21 INFLUENZA VACCINE REFUSED: ICD-10-CM

## 2020-02-27 LAB
ALBUMIN SERPL-MCNC: 4.3 G/DL (ref 3.4–5)
ALBUMIN/GLOB SERPL: 1 {RATIO} (ref 1–2)
ALP LIVER SERPL-CCNC: 100 U/L (ref 45–117)
ALT SERPL-CCNC: 60 U/L (ref 16–61)
ANION GAP SERPL CALC-SCNC: 5 MMOL/L (ref 0–18)
AST SERPL-CCNC: 30 U/L (ref 15–37)
BILIRUB SERPL-MCNC: 0.5 MG/DL (ref 0.1–2)
BUN BLD-MCNC: 10 MG/DL (ref 7–18)
BUN/CREAT SERPL: 10.5 (ref 10–20)
CALCIUM BLD-MCNC: 8.8 MG/DL (ref 8.5–10.1)
CHLORIDE SERPL-SCNC: 106 MMOL/L (ref 98–112)
CHOLEST SMN-MCNC: 162 MG/DL (ref ?–200)
CO2 SERPL-SCNC: 27 MMOL/L (ref 21–32)
CREAT BLD-MCNC: 0.95 MG/DL (ref 0.7–1.3)
DEPRECATED RDW RBC AUTO: 38.9 FL (ref 35.1–46.3)
ERYTHROCYTE [DISTWIDTH] IN BLOOD BY AUTOMATED COUNT: 12.5 % (ref 11–15)
GLOBULIN PLAS-MCNC: 4.3 G/DL (ref 2.8–4.4)
GLUCOSE BLD-MCNC: 94 MG/DL (ref 70–99)
HCT VFR BLD AUTO: 43.9 % (ref 39–53)
HDLC SERPL-MCNC: 25 MG/DL (ref 40–59)
HGB BLD-MCNC: 14.7 G/DL (ref 13–17.5)
LDLC SERPL DIRECT ASSAY-MCNC: 81 MG/DL (ref ?–100)
M PROTEIN MFR SERPL ELPH: 8.6 G/DL (ref 6.4–8.2)
MCH RBC QN AUTO: 28.7 PG (ref 26–34)
MCHC RBC AUTO-ENTMCNC: 33.5 G/DL (ref 31–37)
MCV RBC AUTO: 85.7 FL (ref 80–100)
NONHDLC SERPL-MCNC: 137 MG/DL (ref ?–130)
OSMOLALITY SERPL CALC.SUM OF ELEC: 285 MOSM/KG (ref 275–295)
PATIENT FASTING Y/N/NP: NO
PATIENT FASTING Y/N/NP: NO
PLATELET # BLD AUTO: 173 10(3)UL (ref 150–450)
POTASSIUM SERPL-SCNC: 3.8 MMOL/L (ref 3.5–5.1)
RBC # BLD AUTO: 5.12 X10(6)UL (ref 4.3–5.7)
SODIUM SERPL-SCNC: 138 MMOL/L (ref 136–145)
T4 FREE SERPL-MCNC: 0.9 NG/DL (ref 0.8–1.7)
T4 SERPL-MCNC: 7 UG/DL (ref 4.5–12.1)
TRIGL SERPL-MCNC: 599 MG/DL (ref 30–149)
TSI SER-ACNC: 2.13 MIU/ML (ref 0.36–3.74)
VIT D+METAB SERPL-MCNC: 7.1 NG/ML (ref 30–100)
WBC # BLD AUTO: 10.1 X10(3) UL (ref 4–11)

## 2020-02-27 PROCEDURE — 83036 HEMOGLOBIN GLYCOSYLATED A1C: CPT | Performed by: FAMILY MEDICINE

## 2020-02-27 PROCEDURE — 83721 ASSAY OF BLOOD LIPOPROTEIN: CPT | Performed by: FAMILY MEDICINE

## 2020-02-27 PROCEDURE — 82306 VITAMIN D 25 HYDROXY: CPT | Performed by: FAMILY MEDICINE

## 2020-02-27 PROCEDURE — 84443 ASSAY THYROID STIM HORMONE: CPT | Performed by: FAMILY MEDICINE

## 2020-02-27 PROCEDURE — 80061 LIPID PANEL: CPT | Performed by: FAMILY MEDICINE

## 2020-02-27 PROCEDURE — 84439 ASSAY OF FREE THYROXINE: CPT | Performed by: FAMILY MEDICINE

## 2020-02-27 PROCEDURE — 99395 PREV VISIT EST AGE 18-39: CPT | Performed by: FAMILY MEDICINE

## 2020-02-27 PROCEDURE — 85027 COMPLETE CBC AUTOMATED: CPT | Performed by: FAMILY MEDICINE

## 2020-02-27 PROCEDURE — 80053 COMPREHEN METABOLIC PANEL: CPT | Performed by: FAMILY MEDICINE

## 2020-02-27 NOTE — PROGRESS NOTES
Vel Lockhart is a 20year old male who presents for a complete physical exam.   HPI:   Pt generally doing well.        Immunization History  Administered            Date(s) Administered    DTAP                  10/13/1999  12/27/1999  02/21/2000 0.85 11/13/2019    ANIONGAP 5 11/13/2019    GFR 83 04/20/2017    GFRNAA 125 11/13/2019    GFRAA 145 11/13/2019    CA 8.8 11/13/2019    OSMOCALC 285 11/13/2019    ALKPHO 96 11/13/2019    AST 34 11/13/2019    ALT 81 (H) 11/13/2019    BILT 0.5 11/13/2019    T needed. Take 1 tablet by mouth at 6 PM as needed for agitation/anxiety. ) 30 tablet 0   • Albuterol Sulfate 108 (90 Base) MCG/ACT Inhalation Aerosol Powder, Breath Activated Inhale 2 puffs into the lungs every 4 (four) hours as needed (cough).  1 each 0   • comment: bianca     Alcohol use: Yes      Alcohol/week: 3.0 - 8.0 standard drinks      Types: 3 - 8 Standard drinks or equivalent per week      Frequency: 4 or more times a week      Drinks per session: 1 or 2      Binge frequency: Never    Drug use:  Yes

## 2020-02-28 LAB
EST. AVERAGE GLUCOSE BLD GHB EST-MCNC: 114 MG/DL (ref 68–126)
HBA1C MFR BLD HPLC: 5.6 % (ref ?–5.7)

## 2020-02-29 ENCOUNTER — TELEPHONE (OUTPATIENT)
Dept: FAMILY MEDICINE CLINIC | Facility: CLINIC | Age: 20
End: 2020-02-29

## 2020-02-29 NOTE — TELEPHONE ENCOUNTER
----- Message from Oni Angela DO sent at 2/29/2020  9:06 AM CST -----  My chart note sent, but also call the patient's mom if on the consent.

## 2020-02-29 NOTE — TELEPHONE ENCOUNTER
Mom of pt advised. Verbalized understanding. Will call back shortly to find out patient's schedule to schedule follow up OV.

## 2021-03-17 ENCOUNTER — TELEPHONE (OUTPATIENT)
Dept: FAMILY MEDICINE CLINIC | Facility: CLINIC | Age: 21
End: 2021-03-17

## 2021-03-17 NOTE — TELEPHONE ENCOUNTER
LOV 2/27/20. Mom called wanting to know who we recommend for Psych. Please advise. No future appointments.

## 2021-03-17 NOTE — TELEPHONE ENCOUNTER
Pt would like a list a recommended psychiatrist with in network.  Mom is aware of no referral needed she said Dr. Marylee Collie had gave her a list before   [phone]

## 2021-04-26 ENCOUNTER — OFFICE VISIT (OUTPATIENT)
Dept: FAMILY MEDICINE CLINIC | Facility: CLINIC | Age: 21
End: 2021-04-26

## 2021-04-26 VITALS
TEMPERATURE: 98 F | OXYGEN SATURATION: 97 % | RESPIRATION RATE: 18 BRPM | WEIGHT: 221 LBS | HEIGHT: 64 IN | BODY MASS INDEX: 37.73 KG/M2 | SYSTOLIC BLOOD PRESSURE: 110 MMHG | DIASTOLIC BLOOD PRESSURE: 80 MMHG | HEART RATE: 75 BPM

## 2021-04-26 DIAGNOSIS — R53.83 OTHER FATIGUE: ICD-10-CM

## 2021-04-26 DIAGNOSIS — Z00.00 ANNUAL PHYSICAL EXAM: Primary | ICD-10-CM

## 2021-04-26 DIAGNOSIS — J45.20 MILD INTERMITTENT ASTHMA WITHOUT COMPLICATION: ICD-10-CM

## 2021-04-26 PROCEDURE — 3074F SYST BP LT 130 MM HG: CPT | Performed by: FAMILY MEDICINE

## 2021-04-26 PROCEDURE — 99395 PREV VISIT EST AGE 18-39: CPT | Performed by: FAMILY MEDICINE

## 2021-04-26 PROCEDURE — 84443 ASSAY THYROID STIM HORMONE: CPT | Performed by: FAMILY MEDICINE

## 2021-04-26 PROCEDURE — 82306 VITAMIN D 25 HYDROXY: CPT | Performed by: FAMILY MEDICINE

## 2021-04-26 PROCEDURE — 83540 ASSAY OF IRON: CPT | Performed by: FAMILY MEDICINE

## 2021-04-26 PROCEDURE — 80061 LIPID PANEL: CPT | Performed by: FAMILY MEDICINE

## 2021-04-26 PROCEDURE — 80053 COMPREHEN METABOLIC PANEL: CPT | Performed by: FAMILY MEDICINE

## 2021-04-26 PROCEDURE — 82607 VITAMIN B-12: CPT | Performed by: FAMILY MEDICINE

## 2021-04-26 PROCEDURE — 3079F DIAST BP 80-89 MM HG: CPT | Performed by: FAMILY MEDICINE

## 2021-04-26 PROCEDURE — 3008F BODY MASS INDEX DOCD: CPT | Performed by: FAMILY MEDICINE

## 2021-04-26 PROCEDURE — 83550 IRON BINDING TEST: CPT | Performed by: FAMILY MEDICINE

## 2021-04-26 PROCEDURE — 85025 COMPLETE CBC W/AUTO DIFF WBC: CPT | Performed by: FAMILY MEDICINE

## 2021-04-26 NOTE — PROGRESS NOTES
Norma Sapp is a 23year old male who presents for a complete physical exam.   HPI:   No concerns today. Asthma only with running and well controlled. ACT score is 21. No recent exacerbation. He feels tired on and off.   Started gym recently and follow percentiles are based on CDC (Boys, 2-20 Years) data. Body mass index is 37.93 kg/m².      Lab Results   Component Value Date    GLU 94 02/27/2020    GLU 97 11/13/2019     (H) 03/16/2019     Lab Results   Component Value Date    CHOLEST 162 02/27/20 fasciitis    • PTSD (post-traumatic stress disorder)       Past Surgical History:   Procedure Laterality Date   • OTHER  2012    urchaes removal in abdomen. He was urinating blood at that time.     • OTHER SURGICAL HISTORY N/A 2013    tumor on bladder   • T nocturia or changes in stream. No hematuria.   MUSCULOSKELETAL: denies back pain  NEURO: denies headaches or dizziness  PSYCHE: denies depression or anxiety      EXAM:   /80   Pulse 75   Temp 98.1 °F (36.7 °C)   Resp 18   Ht 5' 4\" (1.626 m)   Wt 221

## 2021-04-26 NOTE — PATIENT INSTRUCTIONS
Exercise for a Healthier Heart     Exercise with a friend. When activity is fun, you're more likely to stick with it. You may wonder how you can improve the health of your heart. If you’re thinking about exercise, you’re on the right track.  You don’t n you enjoy. Walking is one of the easiest things you can do. You can also try swimming, riding a bike, dancing, or taking an exercise class.   Stop exercising and call your doctor if you:  · Have chest pain or feel dizzy or lightheaded  · Feel burning, tight molasses. Cut your usual amount by half. · Use non-sugar sweeteners. Stevia, aspartame, and sucralose can satisfy a sweet tooth without adding calories. · Swap out sugar-filled soda and other drinks. Buy sugar-free or low-calorie beverages.  Remember meena

## 2021-04-27 ENCOUNTER — TELEPHONE (OUTPATIENT)
Dept: FAMILY MEDICINE CLINIC | Facility: CLINIC | Age: 21
End: 2021-04-27

## 2021-04-27 NOTE — TELEPHONE ENCOUNTER
----- Message from Zulma Benjamin MD sent at 4/27/2021 10:16 AM CDT -----  Schedule vv with me to discuss labs.

## 2021-04-28 NOTE — TELEPHONE ENCOUNTER
Future Appointments   Date Time Provider Dieudonne Mandujano   4/30/2021 11:00 AM Ban Winslow MD [facility]

## 2021-04-30 ENCOUNTER — TELEMEDICINE (OUTPATIENT)
Dept: FAMILY MEDICINE CLINIC | Facility: CLINIC | Age: 21
End: 2021-04-30

## 2021-04-30 DIAGNOSIS — R10.12 LEFT UPPER QUADRANT ABDOMINAL PAIN: Primary | ICD-10-CM

## 2021-04-30 DIAGNOSIS — E55.9 VITAMIN D DEFICIENCY: ICD-10-CM

## 2021-04-30 PROCEDURE — 99214 OFFICE O/P EST MOD 30 MIN: CPT | Performed by: FAMILY MEDICINE

## 2021-04-30 NOTE — PROGRESS NOTES
No chief complaint on file. Abdominal pain  This visit is conducted using Telemedicine with live, interactive video and audio. Patient has been referred to the [facility] website at [email or web address] to review the yearly Consent to Treat document. ADD (attention deficit disorder)    • Anxiety    • Asthma    • Bipolar affective ([facility] [address]    • Depression     has had hospitalization and PHP as well   • Developmental delay    • Esophageal reflux    • Headache disorder    • Obesity 11/14/2019   • Plantar fas abdominal pain  Can be musculoskeletal vs constipation vs colitis. We will get us and also refer to gi. If symptoms go to worse go to er.  - US ABDOMEN LIMITED (CPT=76705); Future  - GASTRO - INTERNAL    2. Vitamin D deficiency  Send meds.   - ergocalcife

## 2021-05-05 RX ORDER — ERGOCALCIFEROL 1.25 MG/1
50000 CAPSULE ORAL WEEKLY
Qty: 12 CAPSULE | Refills: 0 | Status: SHIPPED | OUTPATIENT
Start: 2021-05-05 | End: 2021-07-22

## 2021-05-19 ENCOUNTER — HOSPITAL ENCOUNTER (OUTPATIENT)
Dept: ULTRASOUND IMAGING | Age: 21
Discharge: HOME | End: 2021-05-19
Attending: FAMILY MEDICINE
Payer: COMMERCIAL

## 2021-05-19 DIAGNOSIS — R10.12 LEFT UPPER QUADRANT ABDOMINAL PAIN: ICD-10-CM

## 2021-05-19 PROCEDURE — 76700 US EXAM ABDOM COMPLETE: CPT | Performed by: FAMILY MEDICINE

## 2021-05-20 ENCOUNTER — TELEPHONE (OUTPATIENT)
Dept: FAMILY MEDICINE CLINIC | Facility: CLINIC | Age: 21
End: 2021-05-20

## 2021-05-20 NOTE — TELEPHONE ENCOUNTER
----- Message from HAMZAH Gonzalez sent at 5/20/2021  1:22 PM CDT -----  -Ultrasound demonstrates fatty liver disease. Would recommend working on diet and increasing physical activity. Weight loss is very important.  Unlikely this is causing his inte

## 2021-06-02 PROBLEM — K21.9 GASTROESOPHAGEAL REFLUX DISEASE: Status: ACTIVE | Noted: 2021-06-02

## 2021-06-02 PROBLEM — R10.824 LEFT LOWER QUADRANT ABDOMINAL TENDERNESS WITH REBOUND TENDERNESS: Status: ACTIVE | Noted: 2021-06-02

## 2021-06-02 PROBLEM — R19.4 CHANGE IN BOWEL HABITS: Status: ACTIVE | Noted: 2021-06-02

## 2021-06-15 ENCOUNTER — LAB ENCOUNTER (OUTPATIENT)
Dept: LAB | Age: 21
End: 2021-06-15
Attending: INTERNAL MEDICINE
Payer: COMMERCIAL

## 2021-06-15 DIAGNOSIS — Z01.818 PREOP EXAMINATION: ICD-10-CM

## 2021-06-20 ENCOUNTER — LAB ENCOUNTER (OUTPATIENT)
Dept: LAB | Facility: HOSPITAL | Age: 21
End: 2021-06-20
Attending: INTERNAL MEDICINE
Payer: COMMERCIAL

## 2021-06-20 DIAGNOSIS — Z01.818 PREOP EXAMINATION: ICD-10-CM

## 2021-06-23 PROBLEM — K21.9 GERD (GASTROESOPHAGEAL REFLUX DISEASE): Status: ACTIVE | Noted: 2021-06-23

## 2021-06-23 PROBLEM — D12.3 BENIGN NEOPLASM OF TRANSVERSE COLON: Status: ACTIVE | Noted: 2021-06-23

## 2021-06-23 PROBLEM — R10.32 ABDOMINAL PAIN, LLQ: Status: ACTIVE | Noted: 2021-06-23

## 2021-06-23 PROBLEM — R12 CHRONIC HEARTBURN: Status: ACTIVE | Noted: 2021-06-23

## 2021-06-24 PROCEDURE — 88305 TISSUE EXAM BY PATHOLOGIST: CPT | Performed by: INTERNAL MEDICINE

## 2021-06-29 ENCOUNTER — OFFICE VISIT (OUTPATIENT)
Dept: FAMILY MEDICINE CLINIC | Facility: CLINIC | Age: 21
End: 2021-06-29

## 2021-06-29 VITALS
DIASTOLIC BLOOD PRESSURE: 78 MMHG | HEIGHT: 64 IN | SYSTOLIC BLOOD PRESSURE: 116 MMHG | HEART RATE: 85 BPM | RESPIRATION RATE: 18 BRPM | WEIGHT: 218 LBS | OXYGEN SATURATION: 98 % | TEMPERATURE: 98 F | BODY MASS INDEX: 37.22 KG/M2

## 2021-06-29 DIAGNOSIS — R06.83 SNORING: Primary | ICD-10-CM

## 2021-06-29 DIAGNOSIS — G47.00 INSOMNIA, UNSPECIFIED TYPE: ICD-10-CM

## 2021-06-29 PROCEDURE — 99214 OFFICE O/P EST MOD 30 MIN: CPT | Performed by: FAMILY MEDICINE

## 2021-06-29 PROCEDURE — 3078F DIAST BP <80 MM HG: CPT | Performed by: FAMILY MEDICINE

## 2021-06-29 PROCEDURE — 3074F SYST BP LT 130 MM HG: CPT | Performed by: FAMILY MEDICINE

## 2021-06-29 PROCEDURE — 3008F BODY MASS INDEX DOCD: CPT | Performed by: FAMILY MEDICINE

## 2021-06-29 RX ORDER — TRAZODONE HYDROCHLORIDE 100 MG/1
100 TABLET ORAL NIGHTLY
Qty: 90 TABLET | Refills: 0 | Status: ON HOLD | OUTPATIENT
Start: 2021-06-29 | End: 2021-08-27

## 2021-06-29 NOTE — PROGRESS NOTES
Patient presents with:  Referral: SLEEP STUDY, per pt       HPI:    Patient ID: Doroteo Rodriguez is a 23year old male. HPI   Patient is here to discuss snoring. He snore at night feels tired during day.  He has procedure on and gi recommend sleep study a had hospitalization and PHP as well   • Developmental delay    • Diarrhea, unspecified    • Esophageal reflux    • Headache disorder    • Heartburn    • History of depression    • History of mental disorder    • Indigestion    • Irregular bowel habits    • Alcohol/week: 3.0 - 8.0 standard drinks      Types: 3 - 8 Standard drinks or equivalent per week    Drug use: Yes      Types: Cannabis      Comment: Did stupid things with friend (pot)       PHYSICAL EXAM:    /78   Pulse 85   Temp 97.8 °F (36.6 °C)

## 2021-06-30 ENCOUNTER — TELEPHONE (OUTPATIENT)
Dept: FAMILY MEDICINE CLINIC | Facility: CLINIC | Age: 21
End: 2021-06-30

## 2021-06-30 NOTE — TELEPHONE ENCOUNTER
Sleep lab called pt is trying to schedule apt but the office note are not signed yet. so he can't schedule till notes are signed. And they have an opening for Friday. Also pt has an HMO and the referral has to be put for sleep disorder.  Not the Dr. Trina Alicia

## 2021-07-09 ENCOUNTER — OFFICE VISIT (OUTPATIENT)
Dept: SLEEP CENTER | Age: 21
End: 2021-07-09
Attending: FAMILY MEDICINE
Payer: COMMERCIAL

## 2021-07-09 DIAGNOSIS — R06.83 SNORING: ICD-10-CM

## 2021-07-09 PROCEDURE — 95810 POLYSOM 6/> YRS 4/> PARAM: CPT

## 2021-07-24 ENCOUNTER — HOSPITAL ENCOUNTER (OUTPATIENT)
Age: 21
Discharge: HOME | End: 2021-07-24
Payer: COMMERCIAL

## 2021-07-24 VITALS
RESPIRATION RATE: 18 BRPM | DIASTOLIC BLOOD PRESSURE: 74 MMHG | HEART RATE: 78 BPM | TEMPERATURE: 98 F | OXYGEN SATURATION: 97 % | SYSTOLIC BLOOD PRESSURE: 115 MMHG

## 2021-07-24 DIAGNOSIS — R21 RASH AND NONSPECIFIC SKIN ERUPTION: Primary | ICD-10-CM

## 2021-07-24 PROCEDURE — 99203 OFFICE O/P NEW LOW 30 MIN: CPT | Performed by: PHYSICIAN ASSISTANT

## 2021-07-24 RX ORDER — HYDROXYZINE HYDROCHLORIDE 25 MG/1
TABLET, FILM COATED ORAL EVERY 4 HOURS PRN
Qty: 20 TABLET | Refills: 0 | Status: ON HOLD | OUTPATIENT
Start: 2021-07-24 | End: 2021-08-23

## 2021-07-24 NOTE — ED PROVIDER NOTES
Patient Seen in: [facility]      History   Patient presents with:  Rash    Stated Complaint: rash    HPI/Subjective:   HPI    CHIEF COMPLAINT: Rash     HISTORY OF PRESENT ILLNESS: Patient is a pleasant 58-year-old male who presents for evaluat Irregular bowel habits    • Nausea    • Obesity 11/14/2019   • Plantar fasciitis    • PTSD (post-traumatic stress disorder)    • Sleep disturbance    • Sputum production    • Uncomfortable fullness after meals    • Wears glasses    • Weight gain buttock there is a similar appearing rash. Again no vesicles or ulcerations noted.   ED Course   Labs Reviewed - No data to display                MDM      This is a well-appearing 69-year-old male with stable vital signs who presents for evaluation of a r

## 2021-08-23 PROBLEM — F31.60 BIPOLAR DISORDER, CURRENT EPISODE MIXED, UNSPECIFIED (HCC): Status: ACTIVE | Noted: 2021-08-23

## 2021-08-23 NOTE — ED NOTES
The patient was signed out to me for evaluation to be for Melisa Pavon. .  The Melisa Pavon  did see the patient and talk to him at length.   The patient wanted to be admitted because he was feeling that he was not able to take care of himself his

## 2021-08-23 NOTE — ED QUICK NOTES
Spoke to [employer] [facility] lead, eta counselor [phone].

## 2021-08-23 NOTE — ED INITIAL ASSESSMENT (HPI)
Father states pt was tearful, emotions all over. Pt states he doesn't care anymore, has hx of bipolar and wants outpt treatment, wants to speak to someone before it gets worse. Pt states he feels like he doesn't want to cause more pain for family.  Feels li

## 2021-08-23 NOTE — ED PROVIDER NOTES
Patient Seen in: THE [facility] In [place]      History   Patient presents with:  Eval-P    Stated Complaint: feeling anxiety and depression, bipolar no meds at present, denies SI but looki*    HPI/Subjective:   HPI    58-year-old male co SURG UNLISTED                  Social History    Tobacco Use      Smoking status: Current Some Day Smoker        Packs/day: 0.50        Years: 0.00        Pack years: 0        Types: Cigarettes      Smokeless tobacco: Current User      Tobacco comment: Vap components within normal limits   ETHYL ALCOHOL - Normal   CBC WITH DIFFERENTIAL WITH PLATELET    Narrative: The following orders were created for panel order CBC With Differential With Platelet.   Procedure                               Abnormality

## 2021-08-23 NOTE — PROGRESS NOTES
08/23/21 [facility]   COVID Exposure Risk Screening   Have you been practicing social distancing? Yes   Have you been wearing a mask when in the community? Yes   Are the people you live with following social distancing and wearing a mask?  Yes   While you are

## 2021-08-23 NOTE — BH LEVEL OF CARE ASSESSMENT
Crisis Evaluation Assessment    Tania Nielsen YOB: 2000   Age 24year old MRN [id number]   Location [facility] Attending No att. providers found      Patient's legal sex: male  Patient identifies as: male  Pa is scared because he feels more manic and is scared he will put himself in harms way. Risk to Self or Others  Pt denies HI. Some SI, no plan or intent. Pt denies a/v/h.              Suicide Risk Assessments:    Source of information for CSSR: Pa sx.            Access to Means:  Access to Means  Has access to means to attempt suicide or harm others or property: Yes  Description of Access: pt shared past attempts were with knives and hanging, shared he feels like when he is manic he will head in yadira he currently will use in wax form and will smoke. Pt shared that he will also drink socially. Pt shared he started drinking at around 11years old always socially or at parties. Pt denies any treatment.                Functional Achievement:   Pt co yourself to be Fat when others say you are too thin?: No  Would you say that Food dominates your life?: No  SCOFF Score: 0                                                                      Abuse Assessment:  Abuse Assessment  Physical Abuse: Denies  Clayton Summary:   Blossom Ro presented to the [facility] after stating to his father tearfully that he needs help.  According to Blossom Ro he is no longer completely his own ADL's, not attending work and when in attendance he cannot concentrate, and has been of and not at all night, d/t his depressive state. KUNAL sx include, self harm, obbsessive thoughts, heart palpatations, chest discomfort. Pt shared he had a panic attack 2 days and experienced blurred vision.  Pt might have a PTSD dx, pt experiences nightmares

## 2021-08-24 PROBLEM — K76.0 FATTY LIVER: Status: ACTIVE | Noted: 2021-08-24

## 2021-08-24 NOTE — PROGRESS NOTES
08/23/21 2031   Sign-In   Paperwork Signed Patient Rights;Voluntary Admission Form;Agreement and Authorization; Insurance TERRY; Family TERRY   Inpatient Admission Type Adult Voluntary Signed   Patient Provided Rights of Individuals Receiving MH/DD Services

## 2021-09-18 PROBLEM — F41.9 ANXIETY DISORDER: Status: ACTIVE | Noted: 2021-09-18

## 2021-11-12 ENCOUNTER — APPOINTMENT (OUTPATIENT)
Dept: CT IMAGING | Age: 21
End: 2021-11-12
Attending: NURSE PRACTITIONER
Payer: COMMERCIAL

## 2021-11-12 ENCOUNTER — HOSPITAL ENCOUNTER (OUTPATIENT)
Age: 21
Discharge: HOME | End: 2021-11-12
Payer: COMMERCIAL

## 2021-11-12 VITALS
HEART RATE: 60 BPM | RESPIRATION RATE: 18 BRPM | TEMPERATURE: 98 F | OXYGEN SATURATION: 98 % | DIASTOLIC BLOOD PRESSURE: 60 MMHG | SYSTOLIC BLOOD PRESSURE: 114 MMHG

## 2021-11-12 DIAGNOSIS — R10.9 ABDOMINAL PAIN, LEFT LATERAL: Primary | ICD-10-CM

## 2021-11-12 DIAGNOSIS — R11.2 NAUSEA AND VOMITING IN ADULT: ICD-10-CM

## 2021-11-12 PROCEDURE — U0002 COVID-19 LAB TEST NON-CDC: HCPCS | Performed by: NURSE PRACTITIONER

## 2021-11-12 PROCEDURE — 99214 OFFICE O/P EST MOD 30 MIN: CPT | Performed by: NURSE PRACTITIONER

## 2021-11-12 PROCEDURE — 96368 THER/DIAG CONCURRENT INF: CPT | Performed by: NURSE PRACTITIONER

## 2021-11-12 PROCEDURE — 96365 THER/PROPH/DIAG IV INF INIT: CPT | Performed by: NURSE PRACTITIONER

## 2021-11-12 PROCEDURE — 74177 CT ABD & PELVIS W/CONTRAST: CPT | Performed by: NURSE PRACTITIONER

## 2021-11-12 PROCEDURE — 85025 COMPLETE CBC W/AUTO DIFF WBC: CPT | Performed by: NURSE PRACTITIONER

## 2021-11-12 PROCEDURE — 81002 URINALYSIS NONAUTO W/O SCOPE: CPT | Performed by: NURSE PRACTITIONER

## 2021-11-12 PROCEDURE — 80047 BASIC METABLC PNL IONIZED CA: CPT | Performed by: NURSE PRACTITIONER

## 2021-11-12 RX ORDER — ONDANSETRON 4 MG/1
4 TABLET, ORALLY DISINTEGRATING ORAL EVERY 8 HOURS PRN
Qty: 10 TABLET | Refills: 0 | Status: SHIPPED | OUTPATIENT
Start: 2021-11-12 | End: 2021-11-19

## 2021-11-12 RX ORDER — SODIUM CHLORIDE 9 MG/ML
1000 INJECTION, SOLUTION INTRAVENOUS ONCE
Status: COMPLETED | OUTPATIENT
Start: 2021-11-12 | End: 2021-11-12

## 2021-11-12 RX ORDER — ONDANSETRON 2 MG/ML
4 INJECTION INTRAMUSCULAR; INTRAVENOUS ONCE
Status: COMPLETED | OUTPATIENT
Start: 2021-11-12 | End: 2021-11-12

## 2021-11-12 RX ORDER — KETOROLAC TROMETHAMINE 30 MG/ML
30 INJECTION, SOLUTION INTRAMUSCULAR; INTRAVENOUS ONCE
Status: COMPLETED | OUTPATIENT
Start: 2021-11-12 | End: 2021-11-12

## 2021-11-12 NOTE — ED PROVIDER NOTES
Patient Seen in: [facility]      History   Patient presents with:  Abdomen/Flank Pain  Nausea/Vomiting/Diarrhea    Stated Complaint: abd pain with vomitting diar headache and dizzy    Subjective:    This is a 54-year-old male with below stated HISTORY N/A 2013    tumor on bladder   • OTHER SURGICAL HISTORY      tongue tied-clipped   • TONGUE AND MOUTH SURG UNLISTED                  Social History    Tobacco Use      Smoking status: Former Smoker        Years: 2.00        Types: Cigarettes      S reviewed. Constitutional:       General: He is not in acute distress. Appearance: Normal appearance. He is well-developed. He is obese. He is not ill-appearing, toxic-appearing or diaphoretic. HENT:      Head: Normocephalic and atraumatic.       Rig Creatinine 0.60 (*)     All other components within normal limits   RAPID SARS-COV-2 BY PCR - Normal   POCT CBC          Labs, urine, Covid swab, IV fluid hydration dose of Zofran and Toradol. CT abdomen pelvis. MDM      Vital signs stable.   Patie medications    ondansetron 4 MG Oral Tablet Dispersible  Take 1 tablet (4 mg total) by mouth every 8 (eight) hours as needed for Nausea.   Qty: 10 tablet Refills: 0

## 2021-11-12 NOTE — ED INITIAL ASSESSMENT (HPI)
Pt with c/o abdominal pain ongoing for the past 7 months. Pt has a GI specialist that he follows up with. Pt had a colonoscopy 2 months ago.   Pt c/o vomiting and diarrhea with the pain today

## 2021-12-11 ENCOUNTER — HOSPITAL ENCOUNTER (OUTPATIENT)
Age: 21
Discharge: HOME | End: 2021-12-11
Payer: COMMERCIAL

## 2021-12-11 VITALS
HEART RATE: 62 BPM | SYSTOLIC BLOOD PRESSURE: 119 MMHG | DIASTOLIC BLOOD PRESSURE: 82 MMHG | OXYGEN SATURATION: 98 % | TEMPERATURE: 98 F | RESPIRATION RATE: 14 BRPM

## 2021-12-11 DIAGNOSIS — Z20.822 COVID-19 RULED OUT BY LABORATORY TESTING: Primary | ICD-10-CM

## 2021-12-11 PROCEDURE — U0002 COVID-19 LAB TEST NON-CDC: HCPCS | Performed by: NURSE PRACTITIONER

## 2021-12-11 PROCEDURE — 99212 OFFICE O/P EST SF 10 MIN: CPT | Performed by: NURSE PRACTITIONER

## 2021-12-11 NOTE — ED PROVIDER NOTES
Patient Seen in: Immediate [address] [place] [address]      History   Patient presents with:  Testing    Stated Complaint: exposed, no symptoms     Subjective:   26-year-old male presents to the IC with a coexposure at work.   Patient is asymptomatic needs a Covid test a Current User    Vaping Use      Vaping Use: Every day        Substances: Nicotine        Devices: Disposable    Alcohol use: Not Currently      Alcohol/week: 2.0 - 3.0 standard drinks      Types: 2 - 3 Standard drinks or equivalent per week      Comment: v laboratory testing  (primary encounter diagnosis)     Disposition:  Discharge  12/11/2021 12:55 pm    Follow-up:  Malgorzata Ramirez, [address] [place]  [phone]                Medications Prescribed:  Current Discharge Medication Rosa Miles

## 2021-12-14 ENCOUNTER — TELEPHONE (OUTPATIENT)
Dept: FAMILY MEDICINE CLINIC | Facility: CLINIC | Age: 21
End: 2021-12-14

## 2021-12-14 DIAGNOSIS — G47.30 SLEEP APNEA, UNSPECIFIED TYPE: Primary | ICD-10-CM

## 2021-12-14 NOTE — TELEPHONE ENCOUNTER
Mom states patient needs to see pulm. Saw Dr Joaquina Solis for EGD and experienced apnic episodes during it. Referral placed for  Dr Sandip Maki [phone]  Verbalized understanding.

## 2022-01-10 ENCOUNTER — TELEMEDICINE (OUTPATIENT)
Dept: FAMILY MEDICINE CLINIC | Facility: CLINIC | Age: 22
End: 2022-01-10

## 2022-01-10 DIAGNOSIS — J06.9 VIRAL URI: Primary | ICD-10-CM

## 2022-01-10 PROCEDURE — 99213 OFFICE O/P EST LOW 20 MIN: CPT | Performed by: FAMILY MEDICINE

## 2022-01-10 NOTE — PROGRESS NOTES
No chief complaint on file. Patient is of a COVID-positive follow-up  This visit is conducted using Telemedicine with live, interactive video and audio.     Patient has been referred to the [facility] website at [email or web address] to review the yearly Cons • Irregular bowel habits    • Nausea    • Obesity 11/14/2019   • Plantar fasciitis    • PTSD (post-traumatic stress disorder)    • Sleep apnea     will f/u with dr for cpap   • Sleep disturbance    • Sputum production    • Uncomfortable fullness after me Alcohol/week: 2.0 - 3.0 standard drinks      Types: 2 - 3 Standard drinks or equivalent per week      Comment: vodka    Drug use: Not Currently      Types: Cannabis      Comment: hx of agusto, mushrooms, lsd-yrs ago       PHYSICAL EXAM:    There were no vit

## 2022-02-18 NOTE — ED QUICK NOTES
No change in status.  Awaiting for bed Verbal order per Dr. Garrett for cheset xray, pt. Sounded more wheezes,  Pt. May have aspirated, continue to suction at bedside.       Pt. Continue to restless and vomit, told Hattie Yanez CRNA< got orders for blood pressure, and antiemetics, see mar for reference. Scott Sturges, RN on 2/18/2022 at 5:48 PM

## 2022-03-24 ENCOUNTER — TELEMEDICINE (OUTPATIENT)
Dept: FAMILY MEDICINE CLINIC | Facility: CLINIC | Age: 22
End: 2022-03-24

## 2022-03-24 DIAGNOSIS — E78.1 HYPERTRIGLYCERIDEMIA: ICD-10-CM

## 2022-03-24 DIAGNOSIS — J01.00 ACUTE MAXILLARY SINUSITIS, RECURRENCE NOT SPECIFIED: ICD-10-CM

## 2022-03-24 DIAGNOSIS — M54.6 ACUTE BILATERAL THORACIC BACK PAIN: Primary | ICD-10-CM

## 2022-03-24 DIAGNOSIS — R53.83 OTHER FATIGUE: ICD-10-CM

## 2022-03-24 DIAGNOSIS — E55.9 VITAMIN D DEFICIENCY: ICD-10-CM

## 2022-03-24 PROCEDURE — 99214 OFFICE O/P EST MOD 30 MIN: CPT | Performed by: FAMILY MEDICINE

## 2022-03-24 RX ORDER — CYCLOBENZAPRINE HCL 10 MG
10 TABLET ORAL 2 TIMES DAILY PRN
Qty: 20 TABLET | Refills: 0 | Status: SHIPPED | OUTPATIENT
Start: 2022-03-24 | End: 2022-04-03

## 2022-03-24 RX ORDER — AZITHROMYCIN 250 MG/1
TABLET, FILM COATED ORAL
Qty: 6 TABLET | Refills: 0 | Status: SHIPPED | OUTPATIENT
Start: 2022-03-24 | End: 2022-03-29

## 2022-04-24 ENCOUNTER — PATIENT MESSAGE (OUTPATIENT)
Dept: FAMILY MEDICINE CLINIC | Facility: CLINIC | Age: 22
End: 2022-04-24

## 2022-04-25 ENCOUNTER — TELEPHONE (OUTPATIENT)
Dept: FAMILY MEDICINE CLINIC | Facility: CLINIC | Age: 22
End: 2022-04-25

## 2022-04-25 NOTE — TELEPHONE ENCOUNTER
Overdue labs  [facility] sent  Future Appointments   Date Time Provider Dieudonne Mandujano   4/26/2022  9:00 AM Tania Espinoza [facility]   4/28/2022  3:00 PM Tania Espinoza [facility] DE ANDREIA COMUNAL DE CULEBRA [facility] [place]   5/3/2022 12:00 PM Tania Espinoza, [facility] DE ANDREIA COMUNAL DE CULEBRA LO [facility]   5/7/2022  9:15 AM PF COVID RESOURCE PF OUTPT LAB [place]   5/10/2022  2:00 PM Tania Espinoza, [address]  [facility] [place]   5/10/2022  9:00 PM SCHEDULE BY DATE [facility]OSPITALS AT [facility]   5/13/2022  9:00 AM HAMZAH Saha [facility]

## 2022-04-25 NOTE — TELEPHONE ENCOUNTER
From: Nate Aguilera  To: Gal Ch MD  Sent: 4/24/2022 8:32 PM CDT  Subject: Refill medication    Can I have my Omeprazole for my GERD?

## 2022-04-26 RX ORDER — OMEPRAZOLE 40 MG/1
40 CAPSULE, DELAYED RELEASE ORAL DAILY
Qty: 90 CAPSULE | Refills: 0 | Status: SHIPPED | OUTPATIENT
Start: 2022-04-26 | End: 2022-07-25

## 2022-05-07 ENCOUNTER — LAB ENCOUNTER (OUTPATIENT)
Dept: LAB | Age: 22
End: 2022-05-07
Attending: INTERNAL MEDICINE
Payer: COMMERCIAL

## 2022-05-07 DIAGNOSIS — Z01.818 PREOP EXAMINATION: ICD-10-CM

## 2022-05-07 DIAGNOSIS — Z11.59 SCREENING FOR VIRAL DISEASE: ICD-10-CM

## 2022-05-08 LAB — SARS-COV-2 RNA RESP QL NAA+PROBE: NOT DETECTED

## 2022-05-10 ENCOUNTER — OFFICE VISIT (OUTPATIENT)
Dept: SLEEP CENTER | Age: 22
End: 2022-05-10
Attending: INTERNAL MEDICINE
Payer: COMMERCIAL

## 2022-05-10 DIAGNOSIS — R06.83 SNORING: ICD-10-CM

## 2022-05-10 DIAGNOSIS — G47.10 HYPERSOMNIA: ICD-10-CM

## 2022-05-10 DIAGNOSIS — R06.81 APNEA: ICD-10-CM

## 2022-05-10 PROCEDURE — 95811 POLYSOM 6/>YRS CPAP 4/> PARM: CPT

## 2022-06-21 ENCOUNTER — OFFICE VISIT (OUTPATIENT)
Dept: FAMILY MEDICINE CLINIC | Facility: CLINIC | Age: 22
End: 2022-06-21
Payer: COMMERCIAL

## 2022-06-21 VITALS
OXYGEN SATURATION: 98 % | TEMPERATURE: 99 F | HEIGHT: 64 IN | RESPIRATION RATE: 18 BRPM | SYSTOLIC BLOOD PRESSURE: 120 MMHG | HEART RATE: 79 BPM | WEIGHT: 219 LBS | BODY MASS INDEX: 37.39 KG/M2 | DIASTOLIC BLOOD PRESSURE: 70 MMHG

## 2022-06-21 DIAGNOSIS — Z00.00 ANNUAL PHYSICAL EXAM: Primary | ICD-10-CM

## 2022-06-21 DIAGNOSIS — Z79.899 MEDICATION MANAGEMENT: ICD-10-CM

## 2022-06-21 DIAGNOSIS — R53.83 OTHER FATIGUE: ICD-10-CM

## 2022-06-21 DIAGNOSIS — E78.1 HYPERTRIGLYCERIDEMIA: ICD-10-CM

## 2022-06-21 DIAGNOSIS — E55.9 VITAMIN D DEFICIENCY: ICD-10-CM

## 2022-06-21 DIAGNOSIS — R10.12 LEFT UPPER QUADRANT ABDOMINAL PAIN: ICD-10-CM

## 2022-06-21 LAB
ALBUMIN SERPL-MCNC: 4 G/DL (ref 3.4–5)
ALBUMIN/GLOB SERPL: 1 {RATIO} (ref 1–2)
ALP LIVER SERPL-CCNC: 116 U/L
ALT SERPL-CCNC: 53 U/L
ANION GAP SERPL CALC-SCNC: 5 MMOL/L (ref 0–18)
AST SERPL-CCNC: 26 U/L (ref 15–37)
BILIRUB SERPL-MCNC: 0.7 MG/DL (ref 0.1–2)
BUN BLD-MCNC: 7 MG/DL (ref 7–18)
CALCIUM BLD-MCNC: 9.2 MG/DL (ref 8.5–10.1)
CHLORIDE SERPL-SCNC: 106 MMOL/L (ref 98–112)
CHOLEST SERPL-MCNC: 148 MG/DL (ref ?–200)
CO2 SERPL-SCNC: 26 MMOL/L (ref 21–32)
CREAT BLD-MCNC: 0.61 MG/DL
DEPRECATED HBV CORE AB SER IA-ACNC: 375.6 NG/ML
ERYTHROCYTE [DISTWIDTH] IN BLOOD BY AUTOMATED COUNT: 12.5 %
EST. AVERAGE GLUCOSE BLD GHB EST-MCNC: 114 MG/DL (ref 68–126)
FASTING PATIENT LIPID ANSWER: YES
FASTING STATUS PATIENT QL REPORTED: YES
GLOBULIN PLAS-MCNC: 4 G/DL (ref 2.8–4.4)
GLUCOSE BLD-MCNC: 98 MG/DL (ref 70–99)
HBA1C MFR BLD: 5.6 % (ref ?–5.7)
HCT VFR BLD AUTO: 43.7 %
HDLC SERPL-MCNC: 27 MG/DL (ref 40–59)
HGB BLD-MCNC: 14.3 G/DL
INSULIN SERPL-ACNC: 51.8 MU/L (ref 3–25)
IRON SATN MFR SERPL: 39 %
IRON SERPL-MCNC: 120 UG/DL
LDLC SERPL CALC-MCNC: 58 MG/DL (ref ?–100)
MAGNESIUM SERPL-MCNC: 2.2 MG/DL (ref 1.6–2.6)
MCH RBC QN AUTO: 28.2 PG (ref 26–34)
MCHC RBC AUTO-ENTMCNC: 32.7 G/DL (ref 31–37)
MCV RBC AUTO: 86.2 FL
NONHDLC SERPL-MCNC: 121 MG/DL (ref ?–130)
OSMOLALITY SERPL CALC.SUM OF ELEC: 282 MOSM/KG (ref 275–295)
PLATELET # BLD AUTO: 171 10(3)UL (ref 150–450)
POTASSIUM SERPL-SCNC: 3.7 MMOL/L (ref 3.5–5.1)
PROT SERPL-MCNC: 8 G/DL (ref 6.4–8.2)
RBC # BLD AUTO: 5.07 X10(6)UL
SODIUM SERPL-SCNC: 137 MMOL/L (ref 136–145)
TIBC SERPL-MCNC: 307 UG/DL (ref 240–450)
TRANSFERRIN SERPL-MCNC: 206 MG/DL (ref 200–360)
TRIGL SERPL-MCNC: 411 MG/DL (ref 30–149)
TSI SER-ACNC: 1.18 MIU/ML (ref 0.36–3.74)
VIT B12 SERPL-MCNC: 502 PG/ML (ref 193–986)
VIT D+METAB SERPL-MCNC: 10.1 NG/ML (ref 30–100)
VLDLC SERPL CALC-MCNC: 61 MG/DL (ref 0–30)
WBC # BLD AUTO: 6.6 X10(3) UL (ref 4–11)

## 2022-06-21 PROCEDURE — 82785 ASSAY OF IGE: CPT | Performed by: FAMILY MEDICINE

## 2022-06-21 PROCEDURE — 83036 HEMOGLOBIN GLYCOSYLATED A1C: CPT | Performed by: FAMILY MEDICINE

## 2022-06-21 PROCEDURE — 83540 ASSAY OF IRON: CPT | Performed by: FAMILY MEDICINE

## 2022-06-21 PROCEDURE — 82728 ASSAY OF FERRITIN: CPT | Performed by: FAMILY MEDICINE

## 2022-06-21 PROCEDURE — 99395 PREV VISIT EST AGE 18-39: CPT | Performed by: FAMILY MEDICINE

## 2022-06-21 PROCEDURE — 85027 COMPLETE CBC AUTOMATED: CPT | Performed by: FAMILY MEDICINE

## 2022-06-21 PROCEDURE — 80053 COMPREHEN METABOLIC PANEL: CPT | Performed by: FAMILY MEDICINE

## 2022-06-21 PROCEDURE — 86003 ALLG SPEC IGE CRUDE XTRC EA: CPT | Performed by: FAMILY MEDICINE

## 2022-06-21 PROCEDURE — 84443 ASSAY THYROID STIM HORMONE: CPT | Performed by: FAMILY MEDICINE

## 2022-06-21 PROCEDURE — 83550 IRON BINDING TEST: CPT | Performed by: FAMILY MEDICINE

## 2022-06-21 PROCEDURE — 80061 LIPID PANEL: CPT | Performed by: FAMILY MEDICINE

## 2022-06-21 PROCEDURE — 3078F DIAST BP <80 MM HG: CPT | Performed by: FAMILY MEDICINE

## 2022-06-21 PROCEDURE — 82306 VITAMIN D 25 HYDROXY: CPT | Performed by: FAMILY MEDICINE

## 2022-06-21 PROCEDURE — 82607 VITAMIN B-12: CPT | Performed by: FAMILY MEDICINE

## 2022-06-21 PROCEDURE — 83525 ASSAY OF INSULIN: CPT | Performed by: FAMILY MEDICINE

## 2022-06-21 PROCEDURE — 3074F SYST BP LT 130 MM HG: CPT | Performed by: FAMILY MEDICINE

## 2022-06-21 PROCEDURE — 3008F BODY MASS INDEX DOCD: CPT | Performed by: FAMILY MEDICINE

## 2022-06-21 PROCEDURE — 83735 ASSAY OF MAGNESIUM: CPT | Performed by: FAMILY MEDICINE

## 2022-06-21 RX ORDER — FLUOXETINE 10 MG/1
10 CAPSULE ORAL DAILY
COMMUNITY

## 2022-06-21 RX ORDER — DICYCLOMINE HYDROCHLORIDE 10 MG/1
10 CAPSULE ORAL
COMMUNITY

## 2022-06-23 ENCOUNTER — TELEPHONE (OUTPATIENT)
Dept: FAMILY MEDICINE CLINIC | Facility: CLINIC | Age: 22
End: 2022-06-23

## 2022-06-23 LAB
CLAM IGE QN: <0.1 KUA/L (ref ?–0.1)
CODFISH IGE QN: <0.1 KUA/L (ref ?–0.1)
CORN IGE QN: <0.1 KUA/L (ref ?–0.1)
COW MILK IGE QN: <0.1 KUA/L (ref ?–0.1)
EGG WHITE IGE QN: <0.1 KUA/L (ref ?–0.1)
IGE SERPL-ACNC: 535 KU/L (ref 2–214)
PEANUT IGE QN: <0.1 KUA/L (ref ?–0.1)
SCALLOP IGE QN: <0.1 KUA/L (ref ?–0.1)
SESAME SEED IGE QN: <0.1 KUA/L (ref ?–0.1)
SHRIMP IGE QN: 0.38 KUA/L (ref ?–0.1)
SOYBEAN IGE QN: <0.1 KUA/L (ref ?–0.1)
WALNUT IGE QN: <0.1 KUA/L (ref ?–0.1)
WHEAT IGE QN: <0.1 KUA/L (ref ?–0.1)

## 2022-06-23 NOTE — TELEPHONE ENCOUNTER
----- Message from Rosa Maria Montesinos MD sent at 6/23/2022  2:40 PM CDT -----  Schedule a vv with me to discuss labs

## 2022-06-28 ENCOUNTER — TELEMEDICINE (OUTPATIENT)
Dept: FAMILY MEDICINE CLINIC | Facility: CLINIC | Age: 22
End: 2022-06-28

## 2022-06-28 DIAGNOSIS — E16.1 INCREASED INSULIN LEVEL: ICD-10-CM

## 2022-06-28 DIAGNOSIS — E55.9 VITAMIN D DEFICIENCY: ICD-10-CM

## 2022-06-28 DIAGNOSIS — R76.8 ELEVATED IGE LEVEL: Primary | ICD-10-CM

## 2022-06-28 DIAGNOSIS — E78.1 HYPERTRIGLYCERIDEMIA: ICD-10-CM

## 2022-06-28 PROCEDURE — 99214 OFFICE O/P EST MOD 30 MIN: CPT | Performed by: FAMILY MEDICINE

## 2022-06-28 RX ORDER — ERGOCALCIFEROL 1.25 MG/1
50000 CAPSULE ORAL WEEKLY
Qty: 12 CAPSULE | Refills: 0 | Status: SHIPPED | OUTPATIENT
Start: 2022-06-28 | End: 2022-09-14

## 2022-08-22 ENCOUNTER — TELEPHONE (OUTPATIENT)
Dept: CASE MANAGEMENT | Age: 22
End: 2022-08-22

## 2022-08-24 ENCOUNTER — TELEPHONE (OUTPATIENT)
Dept: CASE MANAGEMENT | Age: 22
End: 2022-08-24

## 2022-08-31 ENCOUNTER — PATIENT OUTREACH (OUTPATIENT)
Dept: CASE MANAGEMENT | Age: 22
End: 2022-08-31

## 2022-08-31 NOTE — PROGRESS NOTES
Member has not responded to phone outreach attempts, Eron Reich letter mailed with closure notification on 8/31/22.

## 2022-09-06 ENCOUNTER — PATIENT OUTREACH (OUTPATIENT)
Dept: CASE MANAGEMENT | Age: 22
End: 2022-09-06

## 2022-11-03 ENCOUNTER — PATIENT MESSAGE (OUTPATIENT)
Dept: FAMILY MEDICINE CLINIC | Facility: CLINIC | Age: 22
End: 2022-11-03

## 2022-11-04 NOTE — TELEPHONE ENCOUNTER
From: Roxanna Benavides  To: Sly Pedro MD  Sent: 11/3/2022 10:54 PM CDT  Subject: Counselor     Looking to get help for my bipolar. Do I need a referral to see a counselor? Can I have a list of counselors?

## 2022-11-16 ENCOUNTER — TELEMEDICINE (OUTPATIENT)
Dept: FAMILY MEDICINE CLINIC | Facility: CLINIC | Age: 22
End: 2022-11-16

## 2022-11-16 DIAGNOSIS — R53.83 OTHER FATIGUE: Primary | ICD-10-CM

## 2022-11-16 DIAGNOSIS — E78.1 HYPERTRIGLYCERIDEMIA: ICD-10-CM

## 2022-11-16 DIAGNOSIS — R10.12 LEFT UPPER QUADRANT ABDOMINAL PAIN: ICD-10-CM

## 2022-11-16 DIAGNOSIS — E55.9 VITAMIN D DEFICIENCY: ICD-10-CM

## 2022-11-16 PROCEDURE — 99214 OFFICE O/P EST MOD 30 MIN: CPT | Performed by: FAMILY MEDICINE

## 2022-12-19 ENCOUNTER — TELEPHONE (OUTPATIENT)
Dept: FAMILY MEDICINE CLINIC | Facility: CLINIC | Age: 22
End: 2022-12-19

## 2023-02-06 ENCOUNTER — PATIENT OUTREACH (OUTPATIENT)
Dept: FAMILY MEDICINE CLINIC | Facility: CLINIC | Age: 23
End: 2023-02-06

## 2023-02-08 ENCOUNTER — LAB ENCOUNTER (OUTPATIENT)
Dept: LAB | Age: 23
End: 2023-02-08
Attending: FAMILY MEDICINE
Payer: COMMERCIAL

## 2023-02-08 DIAGNOSIS — R53.83 OTHER FATIGUE: ICD-10-CM

## 2023-02-08 DIAGNOSIS — E78.1 HYPERTRIGLYCERIDEMIA: ICD-10-CM

## 2023-02-08 DIAGNOSIS — R10.12 LEFT UPPER QUADRANT ABDOMINAL PAIN: ICD-10-CM

## 2023-02-08 LAB
ALBUMIN SERPL-MCNC: 4.2 G/DL (ref 3.4–5)
ALBUMIN/GLOB SERPL: 1.1 {RATIO} (ref 1–2)
ALP LIVER SERPL-CCNC: 108 U/L
ALT SERPL-CCNC: 84 U/L
ANION GAP SERPL CALC-SCNC: 4 MMOL/L (ref 0–18)
AST SERPL-CCNC: 50 U/L (ref 15–37)
BILIRUB SERPL-MCNC: 0.4 MG/DL (ref 0.1–2)
BUN BLD-MCNC: 10 MG/DL (ref 7–18)
CALCIUM BLD-MCNC: 9.1 MG/DL (ref 8.5–10.1)
CHLORIDE SERPL-SCNC: 109 MMOL/L (ref 98–112)
CHOLEST SERPL-MCNC: 143 MG/DL (ref ?–200)
CO2 SERPL-SCNC: 27 MMOL/L (ref 21–32)
CREAT BLD-MCNC: 0.79 MG/DL
FASTING PATIENT LIPID ANSWER: YES
FASTING STATUS PATIENT QL REPORTED: YES
GFR SERPLBLD BASED ON 1.73 SQ M-ARVRAT: 128 ML/MIN/1.73M2 (ref 60–?)
GLOBULIN PLAS-MCNC: 4 G/DL (ref 2.8–4.4)
GLUCOSE BLD-MCNC: 89 MG/DL (ref 70–99)
HDLC SERPL-MCNC: 32 MG/DL (ref 40–59)
IRON SATN MFR SERPL: 31 %
IRON SERPL-MCNC: 106 UG/DL
LDLC SERPL CALC-MCNC: 75 MG/DL (ref ?–100)
MAGNESIUM SERPL-MCNC: 2.7 MG/DL (ref 1.6–2.6)
NONHDLC SERPL-MCNC: 111 MG/DL (ref ?–130)
OSMOLALITY SERPL CALC.SUM OF ELEC: 289 MOSM/KG (ref 275–295)
POTASSIUM SERPL-SCNC: 4.3 MMOL/L (ref 3.5–5.1)
PROT SERPL-MCNC: 8.2 G/DL (ref 6.4–8.2)
SODIUM SERPL-SCNC: 140 MMOL/L (ref 136–145)
TIBC SERPL-MCNC: 338 UG/DL (ref 240–450)
TRANSFERRIN SERPL-MCNC: 227 MG/DL (ref 200–360)
TRIGL SERPL-MCNC: 218 MG/DL (ref 30–149)
VIT B12 SERPL-MCNC: 474 PG/ML (ref 193–986)
VIT D+METAB SERPL-MCNC: 8.3 NG/ML (ref 30–100)
VLDLC SERPL CALC-MCNC: 34 MG/DL (ref 0–30)

## 2023-02-08 PROCEDURE — 82306 VITAMIN D 25 HYDROXY: CPT

## 2023-02-08 PROCEDURE — 83735 ASSAY OF MAGNESIUM: CPT

## 2023-02-08 PROCEDURE — 80061 LIPID PANEL: CPT

## 2023-02-08 PROCEDURE — 83550 IRON BINDING TEST: CPT

## 2023-02-08 PROCEDURE — 83013 H PYLORI (C-13) BREATH: CPT

## 2023-02-08 PROCEDURE — 83540 ASSAY OF IRON: CPT

## 2023-02-08 PROCEDURE — 36415 COLL VENOUS BLD VENIPUNCTURE: CPT

## 2023-02-08 PROCEDURE — 80053 COMPREHEN METABOLIC PANEL: CPT

## 2023-02-08 PROCEDURE — 82607 VITAMIN B-12: CPT

## 2023-02-09 LAB — H. PYLORI BREATH TEST: NEGATIVE

## 2023-04-19 ENCOUNTER — TELEPHONE (OUTPATIENT)
Dept: FAMILY MEDICINE CLINIC | Facility: CLINIC | Age: 23
End: 2023-04-19

## 2023-04-19 DIAGNOSIS — E55.9 VITAMIN D DEFICIENCY: Primary | ICD-10-CM

## 2023-04-19 NOTE — TELEPHONE ENCOUNTER
See below  Last refill 6/28/22  Last OV televisit 11/16.22  Last Vit D lab 2/8/23 was 8.3  Future Appointments   Date Time Provider Dieudonne Mandujano   4/24/2023  3:00 PM Dakota CHAVARRIA [facility]   4/24/2023  5:15 PM PLFD ADULT DUAL/CD GROUP [facility]   4/26/2023 10:30 AM Chelly Thomas [facility]   5/2/2023  1:00 PM SHANTANU Kilgore [facility]   5/3/2023 10:30 AM Tiburcio Pino, [address] [facility]   5/10/2023 10:30 AM Chelly Thomas [facility] [address] [place] [address]   5/17/2023 10:30 AM Chelly Thomas [facility]   5/24/2023 10:30 AM Chelly Thomas [facility] [address]   5/31/2023 10:30 AM William, [address], [address] [facility] [address] [facility]   7/12/2023  9:00 AM Zaira Foley, [facility]

## 2023-04-19 NOTE — TELEPHONE ENCOUNTER
WOULD LIKE TO GET A SCRIPT SENT FOR VITAMIN D 50.000 TALK ABOUT THIS WITH DR Vamsi Agee ON LAST VISIT  [id number] - [place] [address] [phone], [phone]   [facility] [id number]

## 2023-04-20 RX ORDER — ERGOCALCIFEROL 1.25 MG/1
50000 CAPSULE ORAL WEEKLY
Qty: 12 CAPSULE | Refills: 1 | Status: SHIPPED | OUTPATIENT
Start: 2023-04-20 | End: 2023-07-07

## 2023-12-08 ENCOUNTER — HOSPITAL ENCOUNTER (OUTPATIENT)
Age: 23
Discharge: HOME | End: 2023-12-08
Payer: COMMERCIAL

## 2023-12-08 VITALS
HEART RATE: 75 BPM | RESPIRATION RATE: 16 BRPM | HEIGHT: 64 IN | SYSTOLIC BLOOD PRESSURE: 123 MMHG | TEMPERATURE: 98 F | DIASTOLIC BLOOD PRESSURE: 80 MMHG | BODY MASS INDEX: 38.41 KG/M2 | WEIGHT: 225 LBS | OXYGEN SATURATION: 100 %

## 2023-12-08 DIAGNOSIS — L08.89 SECONDARY INFECTION OF SKIN: Primary | ICD-10-CM

## 2023-12-08 DIAGNOSIS — T14.8XXA SKIN EXCORIATION: ICD-10-CM

## 2023-12-08 PROCEDURE — 99204 OFFICE O/P NEW MOD 45 MIN: CPT | Performed by: NURSE PRACTITIONER

## 2023-12-08 RX ORDER — KETOCONAZOLE 20 MG/G
1 CREAM TOPICAL DAILY
Qty: 60 G | Refills: 1 | Status: SHIPPED | OUTPATIENT
Start: 2023-12-08

## 2023-12-08 RX ORDER — DOXYCYCLINE HYCLATE 100 MG/1
100 CAPSULE ORAL 2 TIMES DAILY
Qty: 14 CAPSULE | Refills: 0 | Status: SHIPPED | OUTPATIENT
Start: 2023-12-08 | End: 2023-12-15

## 2023-12-08 RX ORDER — OMEPRAZOLE 20 MG/1
20 CAPSULE, DELAYED RELEASE ORAL
COMMUNITY

## 2023-12-08 NOTE — DISCHARGE INSTRUCTIONS
Wash skin with a gentle soap and warm water. Apply ketoconazole ointment once a day after you shower. Before work apply a skin barrier such as a and D ointment or baby Butt paste. This will protect the skin. Follow-up with your primary care physician or dermatology in 1 to 2 weeks if symptoms do not improve.

## 2023-12-20 ENCOUNTER — HOSPITAL ENCOUNTER (OUTPATIENT)
Age: 23
Discharge: HOME | End: 2023-12-20
Payer: COMMERCIAL

## 2023-12-20 VITALS
HEIGHT: 64 IN | HEART RATE: 104 BPM | OXYGEN SATURATION: 96 % | DIASTOLIC BLOOD PRESSURE: 91 MMHG | BODY MASS INDEX: 38.41 KG/M2 | WEIGHT: 225 LBS | RESPIRATION RATE: 18 BRPM | SYSTOLIC BLOOD PRESSURE: 143 MMHG | TEMPERATURE: 98 F

## 2023-12-20 DIAGNOSIS — U07.1 COVID-19: Primary | ICD-10-CM

## 2023-12-20 LAB — SARS-COV-2 RNA RESP QL NAA+PROBE: DETECTED

## 2023-12-20 PROCEDURE — 99203 OFFICE O/P NEW LOW 30 MIN: CPT | Performed by: NURSE PRACTITIONER

## 2023-12-20 PROCEDURE — U0002 COVID-19 LAB TEST NON-CDC: HCPCS | Performed by: NURSE PRACTITIONER

## 2023-12-20 NOTE — ED INITIAL ASSESSMENT (HPI)
Runny nose for about a week. Sore throat started a day ago. Pt having some post nasal drip. Covid test positive at home today.

## 2024-02-22 PROBLEM — F12.21 CANNABIS USE DISORDER, SEVERE, IN EARLY REMISSION (HCC): Status: ACTIVE | Noted: 2020-01-28

## 2024-02-27 ENCOUNTER — TELEMEDICINE (OUTPATIENT)
Dept: FAMILY MEDICINE CLINIC | Facility: CLINIC | Age: 24
End: 2024-02-27
Payer: COMMERCIAL

## 2024-02-27 DIAGNOSIS — R51.9 PRESSURE IN HEAD: ICD-10-CM

## 2024-02-27 DIAGNOSIS — R51.9 CHRONIC INTRACTABLE HEADACHE, UNSPECIFIED HEADACHE TYPE: ICD-10-CM

## 2024-02-27 DIAGNOSIS — K92.1 BLOOD IN STOOL: Primary | ICD-10-CM

## 2024-02-27 DIAGNOSIS — G89.29 CHRONIC INTRACTABLE HEADACHE, UNSPECIFIED HEADACHE TYPE: ICD-10-CM

## 2024-02-27 DIAGNOSIS — R51.9 WORSENING HEADACHES: ICD-10-CM

## 2024-02-27 PROCEDURE — 99214 OFFICE O/P EST MOD 30 MIN: CPT | Performed by: FAMILY MEDICINE

## 2024-03-01 NOTE — PROGRESS NOTES
No chief complaint on file.   Headache  This visit is conducted using Telemedicine with live, interactive video and audio.    Patient has been referred to the [facility] website at [email or web address] to review the yearly Consent to Treat document.    Patient understands and accepts financial responsibility for any deductible, co-insurance and/or co-pays associated with this service.        HPI:    Patient ID: Jovanny Rowan is a 23 year old male.    HPI Acute on chronic headache- he dose have headache in past but recently last month got worse it is every da 7 out of 10. He take tylenol ibuprofen with no much relief.  No nausea vomitng speech vision changes no tingling no numbness. No recent sickness. No cough congestion fever no sinus pressure.     Review of Systems  As above      Current Outpatient Medications   Medication Sig Dispense Refill    diphenhydrAMINE HCl 25 MG Oral Tab Take 1 tablet (25 mg total) by mouth nightly as needed for Sleep. 30 tablet 0    traZODone 50 MG Oral Tab Take 1 tablet (50 mg total) by mouth nightly as needed for Sleep. 30 tablet 0    ARIPiprazole (ABILIFY) 5 MG Oral Tab Take 1 tablet (5 mg total) by mouth daily. 30 tablet 2    hydrOXYzine 25 MG Oral Tab Take 1 tablet (25 mg total) by mouth every 6 (six) hours as needed for Anxiety. 30 tablet 0    omeprazole 20 MG Oral Capsule Delayed Release Take 1 capsule (20 mg total) by mouth every morning before breakfast.      ketoconazole 2 % External Cream Apply 1 Application topically daily. 60 g 1    dicyclomine 10 MG Oral Cap Take 10 mg by mouth 4 (four) times daily before meals and nightly. (Patient not taking: Reported on 12/8/2023)       Allergies:  Allergies   Allergen Reactions    Amoxicillin HIVES    Crabs (Crustaceans) SWELLING    Shrimp SWELLING    Seasonal ITCHING       HISTORY:  Past Medical History:   Diagnosis Date    Abdominal distention     Abdominal pain     ADD (attention deficit disorder)     Anxiety     Asthma (HCC)     Bad  breath     Bipolar affective (HCC)     Bloating     Blurred vision     Chest pain     Chest pain on exertion     Chronic cough     Constipation     Decorative tattoo     Depression     has had hospitalization and PHP as well    Developmental delay     Diarrhea, unspecified     Esophageal reflux     Headache disorder     Heartburn     History of depression     History of mental disorder     Hypertriglyceridemia 07/14/2017    Indigestion     Irregular bowel habits     Nausea     Obesity 11/14/2019    Oppositional defiant disorder     Plantar fasciitis     PTSD (post-traumatic stress disorder)     Sleep apnea     will f/u with dr for cpap    Sleep disturbance     Sputum production     Uncomfortable fullness after meals     Wears glasses     Weight gain       Past Surgical History:   Procedure Laterality Date    OTHER  2012    urchaes removal in abdomen. He was urinating blood at that time.     OTHER SURGICAL HISTORY N/A 2013    tumor on bladder    OTHER SURGICAL HISTORY      tongue tied-clipped    TONGUE AND MOUTH SURG UNLISTED        Family History   Problem Relation Age of Onset    Cancer Father         Testicular cancer    Alcohol and Other Disorders Associated Father     Anxiety Mother     Depression Mother     Psychiatric Mother         postpartum depression    OCD Mother     ADHD Mother         undiagnosed    PTSD Mother     Anxiety Maternal Grandmother     Depression Maternal Grandmother     Bipolar Disorder Maternal Grandmother         undiagnosed    Diabetes Maternal Grandmother     Depression Maternal Grandfather     Anxiety Maternal Grandfather     Alcohol and Other Disorders Associated Maternal Grandfather     Hypertension Maternal Grandfather     Anxiety Sister     PTSD Paternal Grandfather     Alcohol and Other Disorders Associated Paternal Grandfather     Hypertension Paternal Grandfather     Obesity Paternal Grandfather     Anxiety Maternal Cousin Female     Anxiety Maternal Aunt     Anxiety Paternal  Uncle     Alcohol and Other Disorders Associated Paternal Uncle     Substance Abuse Paternal Uncle     Diabetes Paternal Grandmother       Social History:   Social History     Socioeconomic History    Marital status: Single   Tobacco Use    Smoking status: Every Day     Years: 2     Types: Cigarettes    Smokeless tobacco: Current   Vaping Use    Vaping Use: Every day    Substances: Nicotine    Devices: Disposable   Substance and Sexual Activity    Alcohol use: Yes     Alcohol/week: 2.0 - 3.0 standard drinks of alcohol     Types: 2 - 3 Standard drinks or equivalent per week     Comment: vodka    Drug use: Yes     Types: Cannabis     Comment: hx of agusto, mushrooms, lsd-yrs ago        PHYSICAL EXAM:    There were no vitals taken for this visit.   Physical Exam  Constitutional:       General: He is not in acute distress.     Appearance: He is not ill-appearing.   Neurological:      General: No focal deficit present.      Mental Status: He is alert.              ASSESSMENT/PLAN:   1. Blood in stool  Referral given.   - Gastro Referral - In Network    2. Chronic intractable headache, unspecified headache type  Concenr over change in pattern of headache.  Denies any stress.  Mri ordered inform to confirm with insurance regarding coverage.   - MRI BRAIN (CPT=70551); Future    3. Pressure in head  Plan as above  - MRI BRAIN (CPT=70551); Future    4. Worsening headaches  Plan as above  - MRI BRAIN (CPT=70551); Future             No follow-ups on file.

## 2024-03-04 PROBLEM — E66.9 OBESITY (BMI 30-39.9): Status: ACTIVE | Noted: 2022-12-22

## 2024-03-14 ENCOUNTER — PATIENT MESSAGE (OUTPATIENT)
Dept: FAMILY MEDICINE CLINIC | Facility: CLINIC | Age: 24
End: 2024-03-14

## 2024-03-14 DIAGNOSIS — Z71.89 ENCOUNTER FOR COUNSELING ON USE OF CPAP: Primary | ICD-10-CM

## 2024-03-14 DIAGNOSIS — G47.33 OSA (OBSTRUCTIVE SLEEP APNEA): ICD-10-CM

## 2024-03-14 NOTE — TELEPHONE ENCOUNTER
From: Jovanny Rowan  To: Sapphire Mcbride  Sent: 3/14/2024 2:27 PM CDT  Subject: Pulmonology    Requested referral but told need physical first so made appt for physical on May 30th at 2:20pm but I really need to get my cpap updated and mask ordered

## 2024-03-14 NOTE — TELEPHONE ENCOUNTER
Patient requesting pulm referral  Scheduled for 5/30/24  Please advise      Future Appointments   Date Time Provider Department Center   3/18/2024  8:55 AM OS US RM1 OS US [place]   3/21/2024  2:00 PM Tamara Maloney PA [facility]   4/8/2024  1:00 PM Maksim Gonzalez, HAMZAH S[facility]P [facility] SUB GI   4/15/2024  8:45 AM  MR RM4 (3T WIDE)  [facility]   5/30/2024  2:20 PM Sapphire Mcbride MD EMGOSW EMG [place]

## 2024-03-14 NOTE — TELEPHONE ENCOUNTER
From: Jovanny Rowan  To: Sapphire Mcbride  Sent: 3/14/2024 2:09 PM CDT  Subject: Referral Needed    Referral needed for pulmonology, update my cpap machine

## 2024-03-18 ENCOUNTER — HOSPITAL ENCOUNTER (OUTPATIENT)
Dept: ULTRASOUND IMAGING | Age: 24
Discharge: HOME | End: 2024-03-18
Payer: COMMERCIAL

## 2024-03-18 DIAGNOSIS — R10.9 RIGHT SIDED ABDOMINAL PAIN: ICD-10-CM

## 2024-03-18 PROCEDURE — 76700 US EXAM ABDOM COMPLETE: CPT

## 2024-03-21 ENCOUNTER — PATIENT MESSAGE (OUTPATIENT)
Dept: FAMILY MEDICINE CLINIC | Facility: CLINIC | Age: 24
End: 2024-03-21

## 2024-03-21 DIAGNOSIS — Z86.39 HISTORY OF VITAMIN D DEFICIENCY: Primary | ICD-10-CM

## 2024-03-21 DIAGNOSIS — E78.1 HYPERTRIGLYCERIDEMIA: ICD-10-CM

## 2024-03-21 DIAGNOSIS — R51.9 WORSENING HEADACHES: ICD-10-CM

## 2024-03-21 DIAGNOSIS — R53.83 OTHER FATIGUE: ICD-10-CM

## 2024-03-21 NOTE — TELEPHONE ENCOUNTER
Okay to do labs prior to physical?  Lab orders pended.      Physical scheduled for: 5/30/244  Future Appointments   Date Time Provider Department Center   3/25/2024 11:30 AM Chelly Thomas LCPC [facility]   4/4/2024  2:00 PM Tamara Maloney PA [facility]   4/8/2024  1:00 PM Maksim Gonzalez APRN SGINP [facility] SUB GI   4/15/2024  8:45 AM  MR RM4 (3T WIDE)  MRI [facility]   5/30/2024  2:20 PM Sapphire Mcbride MD EMGOSW EMG [place]

## 2024-03-21 NOTE — TELEPHONE ENCOUNTER
From: Jovanny Rowan  To: Sapphire Mcbride  Sent: 3/21/2024 2:31 PM CDT  Subject: Labs February 2023    I have a physical coming up in May but due to my abdomen results I just had through Dr. Olea office I now have a fatty liver it may be due to high cholesterol or diabetes. As far as I know I don’t have diabetes but my last labs were last year February 2023. My lab results were not good in 2023. Can I repeat all labs I had in February 2023 plus can you add A1C and glucose to check if I really do have diabetes. I need to get my life under control and I have many Dr appointments coming up. Thank you

## 2024-03-28 ENCOUNTER — TELEPHONE (OUTPATIENT)
Dept: FAMILY MEDICINE CLINIC | Facility: CLINIC | Age: 24
End: 2024-03-28

## 2024-05-22 ENCOUNTER — HOSPITAL ENCOUNTER (OUTPATIENT)
Age: 24
Discharge: HOME | End: 2024-05-22

## 2024-05-22 VITALS
WEIGHT: 225 LBS | OXYGEN SATURATION: 96 % | SYSTOLIC BLOOD PRESSURE: 124 MMHG | HEART RATE: 88 BPM | HEIGHT: 65 IN | TEMPERATURE: 97 F | DIASTOLIC BLOOD PRESSURE: 83 MMHG | RESPIRATION RATE: 18 BRPM | BODY MASS INDEX: 37.49 KG/M2

## 2024-05-22 DIAGNOSIS — J06.9 VIRAL URI: ICD-10-CM

## 2024-05-22 DIAGNOSIS — R05.1 ACUTE COUGH: Primary | ICD-10-CM

## 2024-05-22 LAB
POCT INFLUENZA A: NEGATIVE
POCT INFLUENZA B: NEGATIVE
S PYO AG THROAT QL: NEGATIVE
SARS-COV-2 RNA RESP QL NAA+PROBE: NOT DETECTED

## 2024-05-22 NOTE — ED PROVIDER NOTES
Patient Seen in: [facility]      History     Chief Complaint   Patient presents with    Cough/URI     Stated Complaint: runny nose    Subjective:   HPI    Pt is a 23yr old male who is here today with c.o cough, body aches, sore throat.  Started Saturday.  Reports taking a covid test which looks lightly positive, but he wasn't sure.  Wants to be retested   Denies fever, chills, abdominal pain.     Objective:   Past Medical History:    Abdominal distention    Abdominal pain    ADD (attention deficit disorder)    Anxiety    Asthma (HCC)    Bad breath    Bipolar affective (HCC)    Black stools    Bloating    Blurred vision    Chest pain    Chest pain on exertion    Chronic cough    Constipation    Decorative tattoo    Depression    has had hospitalization and PHP as well    Developmental delay    Diarrhea, unspecified    Esophageal reflux    Feeling lonely    Headache disorder    Heartburn    Hemorrhoids    High cholesterol    History of depression    History of mental disorder    Hypertriglyceridemia    Indigestion    Irregular bowel habits    Nausea    Obesity    Oppositional defiant disorder    Plantar fasciitis    PTSD (post-traumatic stress disorder)    Shortness of breath    Sleep apnea    will f/u with dr for cpap    Sleep disturbance    Sputum production    Uncomfortable fullness after meals    Wears glasses    Weight gain              Past Surgical History:   Procedure Laterality Date    Other  2012    urchaes removal in abdomen. He was urinating blood at that time.     Other surgical history N/A 2013    tumor on bladder    Other surgical history      tongue tied-clipped    Tongue and mouth surg unlisted                  Social History     Socioeconomic History    Marital status: Single   Tobacco Use    Smoking status: Every Day     Types: Cigarettes    Smokeless tobacco: Current   Vaping Use    Vaping status: Every Day    Substances: Nicotine    Devices: Disposable   Substance and Sexual Activity     Alcohol use: Yes     Alcohol/week: 2.0 - 3.0 standard drinks of alcohol     Types: 2 - 3 Standard drinks or equivalent per week     Comment: vodka    Drug use: Yes     Types: Cannabis     Comment: hx of agusto, mushrooms, lsd-yrs ago     Social Determinants of Health      [facility]    Social Connections    Received from [facility], [facility]    Housing Stability              Review of Systems    Positive for stated complaint: runny nose  Other systems are as noted in HPI.  Constitutional and vital signs reviewed.      All other systems reviewed and negative except as noted above.    Physical Exam     ED Triage Vitals [05/22/24 0814]   /83   Pulse 88   Resp 18   Temp 97.4 °F (36.3 °C)   Temp src Temporal   SpO2 96 %   O2 Device None (Room air)       Current:/83   Pulse 88   Temp 97.4 °F (36.3 °C) (Temporal)   Resp 18   Ht 165.1 cm (5' 5\")   Wt 102.1 kg   SpO2 96%   BMI 37.44 kg/m²         Sore throat exam:     VS: Vital signs reviewed. O2 saturation within normal limits for this patient     General: Patient is awake and alert, oriented to person, place and time. Not in acute distress.      HEENT: Head is normocephalic atraumatic.  No scleral injection.  Posterior oropharynx reveals bilateral tonsillar enlargement and erythema without exudates.  No unilateral tonsillar swelling or uvula deviation.  Tympanic membranes gray without bulging.  Landmarks intact. No trismus. Mucous membranes moist.  No oral pallor.  No oral vesicles.     Neck: No cervical lymphadenopathy. Supple. No meningsmus.      Heart: S1-S2.  Regular rate and rhythm.       Lungs: good inspiratory effort. +air entry bilaterally without wheezes, rhonchi, crackles.  No accessory muscle use or tachypnea.       Extremities: No edema.  Pulses 2+ extremities.        Skin: No rash noted.  No ecchymosis.       CNS: Moves all 4  extremities.  Interacts appropriately.    Differential Diagnosis: viral pharyngitis, strep,  covid, uri     ED Course     Labs Reviewed   POCT RAPID STREP - Normal   POCT FLU TEST - Normal    Narrative:     This assay is a rapid molecular in vitro test utilizing nucleic acid amplification of influenza A and B viral RNA.   RAPID SARS-COV-2 BY PCR - Normal          I have personally  reviewed available prior medical records for any recent pertinent discharge summaries/testing. Patient/family updated on results and plan, a verbalized understanding and agreement with the plan.  I explained to the patient that emergent conditions may arise and to go to the ER for new, worsening or any persistent conditions. I've explained the importance of taking all medicatons as prescribed, follow up, and return precuations,  All questions answered.    Please note that this report has been produced using speech recognition software and may contain errors related to that system including, but not limited to, errors in grammar, punctuation, and spelling, as well as words and phrases that possibly may have been recognized inappropriately.  If there are any questions or concerns, contact the dictating provider for clarification.         MDM              Patient presents with sore throat.  Patient does not have uvula deviation or unilateral tonsillar swelling to indicate tonsillar abscess. No meningsmus. No dysphagia or difficulty handing secretions.  No dental pain.  Afebrile, nontoxic appearing and does not meet SIRS criteria.  Rapid strep test is negative.  Does not appear clinically dehydrated, tolerating oral intake.  Counseled patient on symptomatic treatments.  Recommend follow-up with PCP.  Return to ED precautions discussed with the patient.                              Medical Decision Making      Disposition and Plan     Clinical Impression:  1. Acute cough    2. Viral URI         Disposition:  Discharge  5/22/2024  8:50  am    Follow-up:  Sapphire Mcbride MD  [address] [address]  [phone]                Medications Prescribed:  Discharge Medication List as of 5/22/2024  8:53 AM

## 2024-06-11 ENCOUNTER — HOSPITAL ENCOUNTER (OUTPATIENT)
Dept: MRI IMAGING | Facility: HOSPITAL | Age: 24
Discharge: HOME | End: 2024-06-11
Attending: FAMILY MEDICINE
Payer: COMMERCIAL

## 2024-06-11 DIAGNOSIS — R51.9 WORSENING HEADACHES: ICD-10-CM

## 2024-06-11 DIAGNOSIS — G89.29 CHRONIC INTRACTABLE HEADACHE, UNSPECIFIED HEADACHE TYPE: ICD-10-CM

## 2024-06-11 DIAGNOSIS — R51.9 CHRONIC INTRACTABLE HEADACHE, UNSPECIFIED HEADACHE TYPE: ICD-10-CM

## 2024-06-11 DIAGNOSIS — R51.9 PRESSURE IN HEAD: ICD-10-CM

## 2024-06-11 PROCEDURE — 70551 MRI BRAIN STEM W/O DYE: CPT | Performed by: FAMILY MEDICINE

## 2024-06-16 ENCOUNTER — HOSPITAL ENCOUNTER (OUTPATIENT)
Age: 24
Discharge: HOME | End: 2024-06-16
Payer: COMMERCIAL

## 2024-06-16 VITALS
RESPIRATION RATE: 16 BRPM | HEART RATE: 74 BPM | HEIGHT: 65 IN | TEMPERATURE: 97 F | WEIGHT: 225 LBS | DIASTOLIC BLOOD PRESSURE: 92 MMHG | OXYGEN SATURATION: 98 % | SYSTOLIC BLOOD PRESSURE: 130 MMHG | BODY MASS INDEX: 37.49 KG/M2

## 2024-06-16 DIAGNOSIS — B37.2 CANDIDAL INTERTRIGO: Primary | ICD-10-CM

## 2024-06-16 RX ORDER — KETOCONAZOLE 20 MG/G
1 CREAM TOPICAL 2 TIMES DAILY
Qty: 30 G | Refills: 0 | Status: SHIPPED | OUTPATIENT
Start: 2024-06-16

## 2024-06-16 RX ORDER — IBUPROFEN 400 MG/1
400 TABLET ORAL EVERY 6 HOURS PRN
COMMUNITY

## 2024-06-16 NOTE — ED PROVIDER NOTES
Patient Seen in: [facility]      History     Chief Complaint   Patient presents with    Rash Skin Problem     Stated Complaint: rash    Subjective:   HPI    Pleasant 23-year-old male.  Patient has a history of candidal intertrigo.  He works in a hot kitchen environment.  In the past this has responded well to ketoconazole.  He arrives to the immediate care for evaluation of similar complaints.  He has learned to come in earlier in the process to avoid more significant discomfort.  Believes he has a mild case at this time.  Systemically, feels well.    Objective:   Past Medical History:    Abdominal distention    Abdominal pain    ADD (attention deficit disorder)    Anxiety    Asthma (HCC)    Bad breath    Bipolar affective (HCC)    Black stools    Bloating    Blurred vision    Chest pain    Chest pain on exertion    Chronic cough    Constipation    Decorative tattoo    Depression    has had hospitalization and PHP as well    Developmental delay    Diarrhea, unspecified    Esophageal reflux    Feeling lonely    Headache disorder    Heartburn    Hemorrhoids    High cholesterol    History of depression    History of mental disorder    Hypertriglyceridemia    Indigestion    Irregular bowel habits    Nausea    Obesity    Oppositional defiant disorder    Plantar fasciitis    PTSD (post-traumatic stress disorder)    Shortness of breath    Sleep apnea    will f/u with dr for cpap    Sleep disturbance    Sputum production    Uncomfortable fullness after meals    Wears glasses    Weight gain              Past Surgical History:   Procedure Laterality Date    Other  2012    urchaes removal in abdomen. He was urinating blood at that time.     Other surgical history N/A 2013    tumor on bladder    Other surgical history      tongue tied-clipped    Tongue and mouth surg unlisted                  Social History     Socioeconomic History    Marital status: Single   Tobacco Use    Smoking status: Every Day     Types:  Cigarettes    Smokeless tobacco: Current   Vaping Use    Vaping status: Every Day    Substances: Nicotine    Devices: Disposable   Substance and Sexual Activity    Alcohol use: Yes     Alcohol/week: 2.0 - 3.0 standard drinks of alcohol     Types: 2 - 3 Standard drinks or equivalent per week     Comment: vodka    Drug use: Yes     Types: Cannabis     Comment: hx of agusto, mushrooms, lsd-yrs ago     Social Determinants of Health      Received from [facility], [facility]    Social Connections    Received from [facility], [facility]    Housing Stability              Review of Systems    Positive for stated complaint: rash  Other systems are as noted in HPI.  Constitutional and vital signs reviewed.      All other systems reviewed and negative except as noted above.    Physical Exam     ED Triage Vitals [06/16/24 1014]   BP (!) 130/92   Pulse 74   Resp 16   Temp 97.3 °F (36.3 °C)   Temp src Temporal   SpO2 98 %   O2 Device None (Room air)       Current Vitals:   Vital Signs  BP: (!) 130/92  Pulse: 74  Resp: 16  Temp: 97.3 °F (36.3 °C)  Temp src: Temporal    Oxygen Therapy  SpO2: 98 %  O2 Device: None (Room air)            Physical Exam    Gen: Well appearing, well groomed, alert and aware x 3  Lung: No distress, RR, no retraction  Extremities: Full ROM, no deformity, NVI  Skin: Irritated erythematous changes to the bilateral inguinal region with some skin breakdown and satellite lesions.  Neuro:  Normal Gait    ED Course   Labs Reviewed - No data to display                 MDM      The rash to the bilateral inguinal region is suggestive of candidal intertrigo.  Will initiate ketoconazole.  Also recommend antibiotic ointment once daily for prevention of secondary bacterial infection.  We discussed applying baby powder during his work shifts to avoid further episodes.    Apply the topical antifungal twice daily.  Recommended antibiotic ointment  before bed.  In the future, applied a topical free baby powder to the groin region can help prevent further episodes                               Medical Decision Making      Disposition and Plan     Clinical Impression:  1. Candidal intertrigo         Disposition:  Discharge  6/16/2024 10:29 am    Follow-up:  No follow-up provider specified.        Medications Prescribed:  Current Discharge Medication List        START taking these medications    Details   !! ketoconazole 2 % External Cream Apply 1 Application topically 2 (two) times daily.  Qty: 30 g, Refills: 0       !! - Potential duplicate medications found. Please discuss with provider.

## 2024-06-16 NOTE — DISCHARGE INSTRUCTIONS
Apply the topical antifungal twice daily.  Recommended antibiotic ointment before bed.  In the future, applied a topical free baby powder to the groin region can help prevent further episodes

## 2024-06-16 NOTE — ED INITIAL ASSESSMENT (HPI)
Patient has a rash to bilateral sides of his groin. He get this at times and especially when wearing jeans or clothes that chafe. He has use Ketoconazole cream in the past which helps.

## 2024-06-18 ENCOUNTER — LAB ENCOUNTER (OUTPATIENT)
Dept: LAB | Age: 24
End: 2024-06-18
Attending: FAMILY MEDICINE

## 2024-06-18 ENCOUNTER — PATIENT MESSAGE (OUTPATIENT)
Dept: FAMILY MEDICINE CLINIC | Facility: CLINIC | Age: 24
End: 2024-06-18

## 2024-06-18 DIAGNOSIS — R53.83 OTHER FATIGUE: ICD-10-CM

## 2024-06-18 DIAGNOSIS — Z86.39 HISTORY OF VITAMIN D DEFICIENCY: ICD-10-CM

## 2024-06-18 DIAGNOSIS — E78.1 HYPERTRIGLYCERIDEMIA: ICD-10-CM

## 2024-06-18 LAB
ALBUMIN SERPL-MCNC: 4.1 G/DL (ref 3.4–5)
ALBUMIN/GLOB SERPL: 0.9 {RATIO} (ref 1–2)
ALP LIVER SERPL-CCNC: 132 U/L
ALT SERPL-CCNC: 76 U/L
ANION GAP SERPL CALC-SCNC: 9 MMOL/L (ref 0–18)
AST SERPL-CCNC: 23 U/L (ref 15–37)
BILIRUB SERPL-MCNC: 0.7 MG/DL (ref 0.1–2)
BUN BLD-MCNC: 14 MG/DL (ref 9–23)
CALCIUM BLD-MCNC: 8.7 MG/DL (ref 8.5–10.1)
CHLORIDE SERPL-SCNC: 105 MMOL/L (ref 98–112)
CHOLEST SERPL-MCNC: 153 MG/DL (ref ?–200)
CO2 SERPL-SCNC: 23 MMOL/L (ref 21–32)
CREAT BLD-MCNC: 0.88 MG/DL
EGFRCR SERPLBLD CKD-EPI 2021: 123 ML/MIN/1.73M2 (ref 60–?)
FASTING PATIENT LIPID ANSWER: YES
FASTING STATUS PATIENT QL REPORTED: YES
GLOBULIN PLAS-MCNC: 4.4 G/DL (ref 2.8–4.4)
GLUCOSE BLD-MCNC: 113 MG/DL (ref 70–99)
HDLC SERPL-MCNC: 22 MG/DL (ref 40–59)
LDLC SERPL DIRECT ASSAY-MCNC: 77 MG/DL (ref ?–100)
NONHDLC SERPL-MCNC: 131 MG/DL (ref ?–130)
OSMOLALITY SERPL CALC.SUM OF ELEC: 285 MOSM/KG (ref 275–295)
POTASSIUM SERPL-SCNC: 3.9 MMOL/L (ref 3.5–5.1)
PROT SERPL-MCNC: 8.5 G/DL (ref 6.4–8.2)
SODIUM SERPL-SCNC: 137 MMOL/L (ref 136–145)
TRIGL SERPL-MCNC: 916 MG/DL (ref 30–149)
TSI SER-ACNC: 3.69 MIU/ML (ref 0.36–3.74)
VIT D+METAB SERPL-MCNC: 6 NG/ML (ref 30–100)

## 2024-06-18 PROCEDURE — 84443 ASSAY THYROID STIM HORMONE: CPT

## 2024-06-18 PROCEDURE — 80053 COMPREHEN METABOLIC PANEL: CPT

## 2024-06-18 PROCEDURE — 36415 COLL VENOUS BLD VENIPUNCTURE: CPT

## 2024-06-18 PROCEDURE — 83036 HEMOGLOBIN GLYCOSYLATED A1C: CPT

## 2024-06-18 PROCEDURE — 80061 LIPID PANEL: CPT

## 2024-06-18 PROCEDURE — 82306 VITAMIN D 25 HYDROXY: CPT

## 2024-06-18 PROCEDURE — 83721 ASSAY OF BLOOD LIPOPROTEIN: CPT

## 2024-06-18 NOTE — TELEPHONE ENCOUNTER
From: Jovanny Rowan  To: Sapphire Mcbride  Sent: 6/18/2024 1:17 PM CDT  Subject: Referral Needed     Can I have a referral for an ENT. Constant nose bleeds

## 2024-06-19 LAB
EST. AVERAGE GLUCOSE BLD GHB EST-MCNC: 103 MG/DL (ref 68–126)
HBA1C MFR BLD: 5.2 % (ref ?–5.7)

## 2024-06-27 ENCOUNTER — PATIENT MESSAGE (OUTPATIENT)
Dept: FAMILY MEDICINE CLINIC | Facility: CLINIC | Age: 24
End: 2024-06-27

## 2024-06-27 DIAGNOSIS — E55.9 VITAMIN D DEFICIENCY: Primary | ICD-10-CM

## 2024-06-27 NOTE — TELEPHONE ENCOUNTER
From: Jovanny Rowan  To: Sapphire Mcbride  Sent: 6/27/2024 3:45 PM CDT  Subject: Labs    Dr Mcbride, my vitamin D is so low can I get prescribed a prescription for my vitamin D also my triglycerides are super high can I get prescribe something for that as well? I know you said to wait for my Dr pearson on July 15th but I would like to take care of this now since I haven’t been feeling well at all.

## 2024-06-28 RX ORDER — ERGOCALCIFEROL 1.25 MG/1
50000 CAPSULE ORAL WEEKLY
Qty: 12 CAPSULE | Refills: 1 | Status: SHIPPED | OUTPATIENT
Start: 2024-06-28

## 2024-06-28 NOTE — TELEPHONE ENCOUNTER
Vit d sent   Regarding his high cholesterol/triglyceride recommend to start with low fat diet and exercise and otc fish oil and discuss meds during visit with Carisa.

## 2024-07-15 ENCOUNTER — OFFICE VISIT (OUTPATIENT)
Dept: FAMILY MEDICINE CLINIC | Facility: CLINIC | Age: 24
End: 2024-07-15
Payer: COMMERCIAL

## 2024-07-15 VITALS
SYSTOLIC BLOOD PRESSURE: 114 MMHG | DIASTOLIC BLOOD PRESSURE: 76 MMHG | TEMPERATURE: 99 F | RESPIRATION RATE: 16 BRPM | BODY MASS INDEX: 37.05 KG/M2 | OXYGEN SATURATION: 97 % | WEIGHT: 222.38 LBS | HEART RATE: 86 BPM | HEIGHT: 65 IN

## 2024-07-15 DIAGNOSIS — F17.200 VAPING NICOTINE DEPENDENCE, NON-TOBACCO PRODUCT: ICD-10-CM

## 2024-07-15 DIAGNOSIS — E66.9 OBESITY (BMI 30-39.9): ICD-10-CM

## 2024-07-15 DIAGNOSIS — E78.1 HYPERTRIGLYCERIDEMIA: ICD-10-CM

## 2024-07-15 DIAGNOSIS — Z00.00 GENERAL MEDICAL EXAM: Primary | ICD-10-CM

## 2024-07-15 DIAGNOSIS — F31.60 BIPOLAR AFFECTIVE DISORDER, CURRENT EPISODE MIXED, CURRENT EPISODE SEVERITY UNSPECIFIED (HCC): ICD-10-CM

## 2024-07-15 DIAGNOSIS — K76.0 FATTY LIVER: ICD-10-CM

## 2024-07-15 DIAGNOSIS — F10.20 ALCOHOL USE DISORDER, MODERATE, DEPENDENCE (HCC): ICD-10-CM

## 2024-07-15 DIAGNOSIS — J35.1 HYPERTROPHY TONSILS: ICD-10-CM

## 2024-07-15 DIAGNOSIS — R04.0 NOSEBLEED: ICD-10-CM

## 2024-07-15 DIAGNOSIS — J30.1 SEASONAL ALLERGIC RHINITIS DUE TO POLLEN: ICD-10-CM

## 2024-07-15 DIAGNOSIS — F31.9 BIPOLAR 1 DISORDER (HCC): ICD-10-CM

## 2024-07-15 DIAGNOSIS — E55.9 HYPOVITAMINOSIS D: ICD-10-CM

## 2024-07-15 DIAGNOSIS — G47.33 OSA (OBSTRUCTIVE SLEEP APNEA): ICD-10-CM

## 2024-07-15 DIAGNOSIS — R74.8 ELEVATED LIVER ENZYMES: ICD-10-CM

## 2024-07-15 DIAGNOSIS — F17.290 VAPING NICOTINE DEPENDENCE, TOBACCO PRODUCT: ICD-10-CM

## 2024-07-15 DIAGNOSIS — J45.20 MILD INTERMITTENT ASTHMA WITHOUT COMPLICATION (HCC): ICD-10-CM

## 2024-07-15 PROBLEM — R10.824 LEFT LOWER QUADRANT ABDOMINAL TENDERNESS WITH REBOUND TENDERNESS: Status: RESOLVED | Noted: 2021-06-02 | Resolved: 2024-07-15

## 2024-07-15 PROBLEM — F12.90 CANNABIS USE DISORDER: Status: ACTIVE | Noted: 2020-01-28

## 2024-07-15 RX ORDER — ALBUTEROL SULFATE 90 UG/1
1-2 AEROSOL, METERED RESPIRATORY (INHALATION) EVERY 4 HOURS PRN
Qty: 1 EACH | Refills: 0 | Status: SHIPPED | OUTPATIENT
Start: 2024-07-15

## 2024-07-15 NOTE — PROGRESS NOTES
HPI:   Patient is here for physical today. Review lab work done in June.    Concerns:   Would like referral for ENT. Getting a lot nosebleeds  Would like prescription for Ozempic. Needs to lose weight. No family or personal history of thyroid cancer.  What can he take to lower triglycerides. Mom is on Tricor and it worked well for her.    Exercise: active at work,  at [employer].    Diet: doesn't watch    Wt Readings from Last 6 Encounters:   07/15/24 222 lb 6.4 oz (100.9 kg)   06/16/24 225 lb (102.1 kg)   05/22/24 225 lb (102.1 kg)   03/04/24 225 lb 3.2 oz (102.2 kg)   12/20/23 225 lb (102.1 kg)   12/08/23 225 lb (102.1 kg)     Body mass index is 37.01 kg/m².   Immunization History   Administered Date(s) Administered   • Covid-19 Vaccine Pfizer 30 mcg/0.3 ml 09/13/2021   • DTAP 10/13/1999, 12/27/1999, 02/21/2000, 02/19/2001, 06/19/2004   • DTAP INFANRIX 10/13/1999, 12/27/1999, 02/21/2000, 02/19/2001, 06/19/2004   • HEP B 11/20/2000, 12/19/2000, 02/19/2001   • HEP B, Ped/Adol 11/20/2000, 12/19/2000, 02/19/2001   • HIB 10/13/1999, 12/27/1999, 02/21/2000, 02/19/2001   • Hep B, Unspecified Formulation 11/20/2000, 12/19/2000, 02/19/2001   • Hib, Unspecified Formulation 10/13/1999, 12/27/1999, 02/21/2000, 02/19/2001   • IPV 10/13/1999, 12/27/1999, 02/21/2000, 02/19/2001, 09/16/2004   • Influenza 10/13/1999, 12/27/1999, 02/21/2000, 02/19/2001   • MMR 11/20/2000, 06/19/2004   • Meningococcal-Menactra 08/17/2010, 01/11/2016   • TDAP 08/13/2013   • Varicella 11/20/2000, 08/17/2010     Cholesterol, Total (mg/dL)   Date Value   06/18/2024 153   02/08/2023 143   06/21/2022 148     HDL Cholesterol (mg/dL)   Date Value   06/18/2024 22 (L)   02/08/2023 32 (L)   06/21/2022 27 (L)     LDL Cholesterol   Date Value   06/18/2024      Comment:     Unable to calculate LDL due to Triglycerides >800 mg/dL        Desirable <100 mg/dL   Borderline 100-129 mg/dL   High     >=130mg/dL       02/08/2023 75 mg/dL   06/21/2022 58 mg/dL      AST (U/L)   Date Value   06/18/2024 23   02/08/2023 50 (H)   06/21/2022 26     ALT (U/L)   Date Value   06/18/2024 76 (H)   02/08/2023 84 (H)   06/21/2022 53        Allergies:  Allergies   Allergen Reactions   • Amoxicillin HIVES   • Crab Extract Allergy Skin Test ANGIOEDEMA   • Crabs (Crustaceans) SWELLING   • Shellfish Allergy ANGIOEDEMA   • Shrimp SWELLING   • Seasonal ITCHING      Current Meds:  Current Outpatient Medications on File Prior to Visit   Medication Sig Dispense Refill   • ergocalciferol 1.25 MG (43710 UT) Oral Cap Take 1 capsule (50,000 Units total) by mouth once a week. 12 capsule 1   • ibuprofen 400 MG Oral Tab Take 1 tablet (400 mg total) by mouth every 6 (six) hours as needed for Pain. Prn only     • diphenhydrAMINE HCl 25 MG Oral Tab Take 1 tablet (25 mg total) by mouth nightly as needed for Sleep. 30 tablet 0   • ketoconazole 2 % External Cream Apply 1 Application topically daily. 60 g 1   • traZODone 50 MG Oral Tab Take 1 tablet (50 mg total) by mouth nightly as needed for Sleep. (Patient not taking: Reported on 7/15/2024) 30 tablet 0     No current facility-administered medications on file prior to visit.        History:  Past Medical History:   • Abdominal distention   • Abdominal pain   • ADD (attention deficit disorder)   • Anxiety   • Asthma (HCC)   • Bad breath   • Bipolar affective (HCC)   • Black stools   • Bloating   • Blurred vision   • Chest pain   • Chest pain on exertion   • Chronic cough   • Constipation   • Decorative tattoo   • Depression    has had hospitalization and PHP as well   • Developmental delay   • Diarrhea, unspecified   • Esophageal reflux   • Feeling lonely   • Headache disorder   • Heartburn   • Hemorrhoids   • High cholesterol   • History of depression   • History of mental disorder   • Hypertriglyceridemia   • Indigestion   • Irregular bowel habits   • Left lower quadrant abdominal tenderness with rebound tenderness   • Nausea   • Obesity   • Oppositional  defiant disorder   • Plantar fascial fibromatosis   • Plantar fasciitis   • PTSD (post-traumatic stress disorder)   • Shortness of breath   • Sleep apnea    will f/u with dr for cpap   • Sleep disturbance   • Sputum production   • Uncomfortable fullness after meals   • Wears glasses   • Weight gain      Past Surgical History:   Procedure Laterality Date   • Other  2012    urchaes removal in abdomen. He was urinating blood at that time.    • Other surgical history N/A 2013    tumor on bladder   • Other surgical history      tongue tied-clipped   • Tongue and mouth surg unlisted        Family History   Problem Relation Age of Onset   • Cancer Father         Testicular cancer   • Alcohol and Other Disorders Associated Father    • Anxiety Mother    • Depression Mother    • Psychiatric Mother         postpartum depression   • OCD Mother    • ADHD Mother         undiagnosed   • PTSD Mother    • Anxiety Maternal Grandmother    • Depression Maternal Grandmother    • Bipolar Disorder Maternal Grandmother         undiagnosed   • Diabetes Maternal Grandmother    • Depression Maternal Grandfather    • Anxiety Maternal Grandfather    • Alcohol and Other Disorders Associated Maternal Grandfather    • Hypertension Maternal Grandfather    • Anxiety Sister    • PTSD Paternal Grandfather    • Alcohol and Other Disorders Associated Paternal Grandfather    • Hypertension Paternal Grandfather    • Obesity Paternal Grandfather    • Anxiety Maternal Cousin Female    • Anxiety Maternal Aunt    • Anxiety Paternal Uncle    • Alcohol and Other Disorders Associated Paternal Uncle    • Substance Abuse Paternal Uncle    • Diabetes Paternal Grandmother       Family Status   Relation Status   • Fa Alive   • Mo Alive   • MGMA Alive   • MGFA Alive   • Sis Alive   • PGFA Alive   • Mat Cous Fem Alive   • Mat Aunt Alive   • Paternal Unc Alive   • PGMA Alive   • Bro Alive      Social History     Socioeconomic History   • Marital status: Single   Tobacco  Use   • Smoking status: Former     Types: Cigarettes   • Smokeless tobacco: Current   Vaping Use   • Vaping status: Every Day   • Substances: Nicotine   • Devices: Disposable   Substance and Sexual Activity   • Alcohol use: Yes     Alcohol/week: 2.0 - 3.0 standard drinks of alcohol     Types: 2 - 3 Standard drinks or equivalent per week     Comment: vodka   • Drug use: Yes     Types: Cannabis     Comment: hx of agusto, mushrooms, lsd-yrs ago     Social Determinants of Health      Received [facility], [facility]    Social Connections    Received from [facility], [facility]    Housing Stability        REVIEW OF SYSTEMS:   Review of Systems   Constitutional:  Negative for appetite change, chills, fatigue, fever and unexpected weight change.   HENT:  Positive for nosebleeds. Negative for congestion, ear pain, postnasal drip, rhinorrhea, sore throat and trouble swallowing.    Respiratory:  Positive for shortness of breath (with exertion from being overweight). Negative for cough, chest tightness and wheezing.    Cardiovascular:  Negative for chest pain and palpitations.   Gastrointestinal:  Negative for abdominal pain (not currently), constipation, diarrhea, nausea and vomiting.   Endocrine: Negative for cold intolerance, heat intolerance, polydipsia, polyphagia and polyuria.   Genitourinary:  Negative for dysuria and frequency.   Musculoskeletal:  Negative for myalgias.   Skin:  Negative for rash.   Neurological:  Negative for headaches.   Psychiatric/Behavioral:  Negative for decreased concentration, dysphoric mood, sleep disturbance and suicidal ideas. The patient is not nervous/anxious.         Stopped all his bipolar meds        Objective   EXAM:   /76   Pulse 86   Temp 99.1 °F (37.3 °C)   Resp 16   Ht 5' 5\" (1.651 m)   Wt 222 lb 6.4 oz (100.9 kg)   SpO2 97%   BMI 37.01 kg/m²   Patient weight not recorded   Physical  Exam  Constitutional:       Appearance: Normal appearance. He is well-developed and well-groomed. He is obese. He is not ill-appearing.   HENT:      Right Ear: Tympanic membrane, ear canal and external ear normal.      Left Ear: Tympanic membrane, ear canal and external ear normal.      Nose: Nose normal.      Mouth/Throat:      Mouth: Mucous membranes are moist.      Pharynx: Oropharynx is clear. Uvula midline. No pharyngeal swelling.      Tonsils: 1+ on the right. 1+ on the left.   Eyes:      Conjunctiva/sclera: Conjunctivae normal.      Pupils: Pupils are equal, round, and reactive to light.   Neck:      Thyroid: No thyromegaly.   Cardiovascular:      Rate and Rhythm: Normal rate and regular rhythm.      Heart sounds: Normal heart sounds.   Pulmonary:      Effort: Pulmonary effort is normal.      Breath sounds: Normal breath sounds. No wheezing or rhonchi.   Abdominal:      General: Bowel sounds are normal.      Palpations: Abdomen is soft.   Musculoskeletal:         General: Normal range of motion.      Cervical back: Normal range of motion.   Skin:     General: Skin is warm and dry.   Neurological:      General: No focal deficit present.      Mental Status: He is alert and oriented to person, place, and time.   Psychiatric:         Mood and Affect: Mood normal.         Behavior: Behavior normal.          ASSESSMENT AND PLAN     Diagnoses and all orders for this visit:    General medical exam    Bipolar 1 disorder ([facility])  -     Psychiatry Referral - In Network    Bipolar affective disorder, current episode mixed, current episode severity unspecified ([facility])  -     Psychiatry Referral - In Network    Alcohol use disorder, moderate, dependence ([facility])  -     Psychiatry Referral - In Network    Hypertriglyceridemia  -     Vitamin D [E]; Future  -     Cardio Referral - Internal  -     CT CALCIUM SCORING; Future  -     semaglutide-weight management 0.25 MG/0.5ML Subcutaneous Solution Auto-injector; Inject 0.5 mL (0.25 mg  total) into the skin once a week for 4 doses.    Mild intermittent asthma without complication (HCC)  -     albuterol 108 (90 Base) MCG/ACT Inhalation Aero Soln; Inhale 1-2 puffs into the lungs every 4 (four) hours as needed for Wheezing or Shortness of Breath.    BREN (obstructive sleep apnea)  -     Pulmonary Referral - In Network  -     ENT Referral - In Network  -     semaglutide-weight management 0.25 MG/0.5ML Subcutaneous Solution Auto-injector; Inject 0.5 mL (0.25 mg total) into the skin once a week for 4 doses.    Hypovitaminosis D  -     Vitamin D [E]; Future    Elevated liver enzymes  -     Gastro Referral - In Network  -     Vitamin D [E]; Future  -     Cardio Referral - Internal    Fatty liver  -     Gastro Referral - In Network  -     semaglutide-weight management 0.25 MG/0.5ML Subcutaneous Solution Auto-injector; Inject 0.5 mL (0.25 mg total) into the skin once a week for 4 doses.    Obesity (BMI 30-39.9)  -     CT CALCIUM SCORING; Future  -     semaglutide-weight management 0.25 MG/0.5ML Subcutaneous Solution Auto-injector; Inject 0.5 mL (0.25 mg total) into the skin once a week for 4 doses.    Hypertrophy tonsils  -     ENT Referral - In Network    Seasonal allergic rhinitis due to pollen    Nosebleed    Vaping nicotine dependence, non-tobacco product    Vaping nicotine dependence, tobacco product    Reviewed recent lab work  Recommend cardiology consult. Given elevated liver enzymes and alcohol intake, would like their input on how to manage his triglycerides  Referral for ENT and Pulm placed  Would like to resume Trazodone. Recommend re-establishing with psych  Recommend Prevnar and Tdap. Patient needs to discuss with mom first  Recommend Tobacco and Vaping cessation

## 2024-07-17 ENCOUNTER — TELEPHONE (OUTPATIENT)
Age: 24
End: 2024-07-17

## 2024-08-15 ENCOUNTER — TELEPHONE (OUTPATIENT)
Dept: FAMILY MEDICINE CLINIC | Facility: CLINIC | Age: 24
End: 2024-08-15

## 2024-08-15 NOTE — TELEPHONE ENCOUNTER
Imaging due  Mychart message sent  Future Appointments   Date Time Provider Department Center   10/7/2024 12:40 PM Carolyn Chaparro APRN [facility] [place]

## 2024-08-26 ENCOUNTER — LAB ENCOUNTER (OUTPATIENT)
Dept: LAB | Age: 24
End: 2024-08-26
Attending: NURSE PRACTITIONER
Payer: COMMERCIAL

## 2024-08-26 DIAGNOSIS — R79.89 HYPOURICEMIA: Primary | ICD-10-CM

## 2024-08-26 DIAGNOSIS — E78.1 PURE HYPERGLYCERIDEMIA: ICD-10-CM

## 2024-08-26 LAB
ALBUMIN SERPL-MCNC: 5 G/DL (ref 3.2–4.8)
ALBUMIN/GLOB SERPL: 1.5 {RATIO} (ref 1–2)
ALP LIVER SERPL-CCNC: 116 U/L
ALT SERPL-CCNC: 48 U/L
ANION GAP SERPL CALC-SCNC: 3 MMOL/L (ref 0–18)
AST SERPL-CCNC: 29 U/L (ref ?–34)
BASOPHILS # BLD AUTO: 0.03 X10(3) UL (ref 0–0.2)
BASOPHILS NFR BLD AUTO: 0.5 %
BILIRUB SERPL-MCNC: 1 MG/DL (ref 0.3–1.2)
BUN BLD-MCNC: 7 MG/DL (ref 9–23)
CALCIUM BLD-MCNC: 10 MG/DL (ref 8.7–10.4)
CHLORIDE SERPL-SCNC: 105 MMOL/L (ref 98–112)
CHOLEST SERPL-MCNC: 156 MG/DL (ref ?–200)
CO2 SERPL-SCNC: 29 MMOL/L (ref 21–32)
CREAT BLD-MCNC: 0.64 MG/DL
EGFRCR SERPLBLD CKD-EPI 2021: 135 ML/MIN/1.73M2 (ref 60–?)
EOSINOPHIL # BLD AUTO: 0.11 X10(3) UL (ref 0–0.7)
EOSINOPHIL NFR BLD AUTO: 1.7 %
ERYTHROCYTE [DISTWIDTH] IN BLOOD BY AUTOMATED COUNT: 12.5 %
FASTING PATIENT LIPID ANSWER: YES
FASTING STATUS PATIENT QL REPORTED: YES
GLOBULIN PLAS-MCNC: 3.4 G/DL (ref 2–3.5)
GLUCOSE BLD-MCNC: 94 MG/DL (ref 70–99)
HCT VFR BLD AUTO: 43.3 %
HDLC SERPL-MCNC: 30 MG/DL (ref 40–59)
HGB BLD-MCNC: 14.6 G/DL
IMM GRANULOCYTES # BLD AUTO: 0.05 X10(3) UL (ref 0–1)
IMM GRANULOCYTES NFR BLD: 0.8 %
LDLC SERPL CALC-MCNC: 84 MG/DL (ref ?–100)
LYMPHOCYTES # BLD AUTO: 1.97 X10(3) UL (ref 1–4)
LYMPHOCYTES NFR BLD AUTO: 30.2 %
MCH RBC QN AUTO: 28.2 PG (ref 26–34)
MCHC RBC AUTO-ENTMCNC: 33.7 G/DL (ref 31–37)
MCV RBC AUTO: 83.8 FL
MONOCYTES # BLD AUTO: 0.41 X10(3) UL (ref 0.1–1)
MONOCYTES NFR BLD AUTO: 6.3 %
NEUTROPHILS # BLD AUTO: 3.96 X10 (3) UL (ref 1.5–7.7)
NEUTROPHILS # BLD AUTO: 3.96 X10(3) UL (ref 1.5–7.7)
NEUTROPHILS NFR BLD AUTO: 60.5 %
NONHDLC SERPL-MCNC: 126 MG/DL (ref ?–130)
OSMOLALITY SERPL CALC.SUM OF ELEC: 282 MOSM/KG (ref 275–295)
PLATELET # BLD AUTO: 177 10(3)UL (ref 150–450)
POTASSIUM SERPL-SCNC: 3.6 MMOL/L (ref 3.5–5.1)
PROT SERPL-MCNC: 8.4 G/DL (ref 5.7–8.2)
RBC # BLD AUTO: 5.17 X10(6)UL
SODIUM SERPL-SCNC: 137 MMOL/L (ref 136–145)
T4 FREE SERPL-MCNC: 1.2 NG/DL (ref 0.8–1.7)
TRIGL SERPL-MCNC: 251 MG/DL (ref 30–149)
TSI SER-ACNC: 2.37 MIU/ML (ref 0.55–4.78)
VLDLC SERPL CALC-MCNC: 40 MG/DL (ref 0–30)
WBC # BLD AUTO: 6.5 X10(3) UL (ref 4–11)

## 2024-08-26 PROCEDURE — 85025 COMPLETE CBC W/AUTO DIFF WBC: CPT

## 2024-08-26 PROCEDURE — 80061 LIPID PANEL: CPT

## 2024-08-26 PROCEDURE — 80053 COMPREHEN METABOLIC PANEL: CPT

## 2024-08-26 PROCEDURE — 84443 ASSAY THYROID STIM HORMONE: CPT

## 2024-08-26 PROCEDURE — 36415 COLL VENOUS BLD VENIPUNCTURE: CPT

## 2024-08-26 PROCEDURE — 83036 HEMOGLOBIN GLYCOSYLATED A1C: CPT

## 2024-08-26 PROCEDURE — 84439 ASSAY OF FREE THYROXINE: CPT

## 2024-08-27 LAB
EST. AVERAGE GLUCOSE BLD GHB EST-MCNC: 103 MG/DL (ref 68–126)
HBA1C MFR BLD: 5.2 % (ref ?–5.7)

## 2024-09-10 ENCOUNTER — PATIENT MESSAGE (OUTPATIENT)
Dept: FAMILY MEDICINE CLINIC | Facility: CLINIC | Age: 24
End: 2024-09-10

## 2024-09-10 DIAGNOSIS — R04.0 RECURRENT EPISTAXIS: Primary | ICD-10-CM

## 2024-09-10 NOTE — TELEPHONE ENCOUNTER
Last office visit for general medical exam on 7/15/24 with HAMZAH Smith  Was seen in ER today for epistaxis

## 2024-09-10 NOTE — TELEPHONE ENCOUNTER
From: Jovanny Rowan  Sent: 9/10/2024 7:39 AM CDT  To: [facility] Clinical Staff  Subject: Referral    One more thing because I have been having a lot of nose bleeds and loose an excessive amount of blood can I have lab work done to make sure I don’t have anemia! Like to check my iron, ferritin, hemoglobin asap?

## 2024-09-12 NOTE — TELEPHONE ENCOUNTER
Future Appointments   Date Time Provider Department Center   10/7/2024 12:40 PM Carolyn Chaparro APRN [facility] [place]   10/16/2024  8:40 AM Adalid Paul MD [id number]

## 2024-09-18 ENCOUNTER — HOSPITAL ENCOUNTER (OUTPATIENT)
Age: 24
Discharge: HOME | End: 2024-09-18
Payer: COMMERCIAL

## 2024-09-18 ENCOUNTER — APPOINTMENT (OUTPATIENT)
Dept: CT IMAGING | Age: 24
End: 2024-09-18
Attending: NURSE PRACTITIONER
Payer: COMMERCIAL

## 2024-09-18 VITALS
BODY MASS INDEX: 37.65 KG/M2 | WEIGHT: 226 LBS | RESPIRATION RATE: 18 BRPM | HEART RATE: 89 BPM | TEMPERATURE: 98 F | HEIGHT: 65 IN | DIASTOLIC BLOOD PRESSURE: 82 MMHG | SYSTOLIC BLOOD PRESSURE: 135 MMHG | OXYGEN SATURATION: 98 %

## 2024-09-18 DIAGNOSIS — R10.9 LEFT-SIDED ABDOMINAL PAIN OF UNKNOWN ETIOLOGY: Primary | ICD-10-CM

## 2024-09-18 DIAGNOSIS — B37.2 CANDIDAL INTERTRIGO: ICD-10-CM

## 2024-09-18 DIAGNOSIS — K62.5 RECTAL BLEEDING: ICD-10-CM

## 2024-09-18 LAB
#MXD IC: 0.6 X10ˆ3/UL (ref 0.1–1)
BILIRUB UR QL STRIP: NEGATIVE
BUN BLD-MCNC: 9 MG/DL (ref 7–18)
CHLORIDE BLD-SCNC: 103 MMOL/L (ref 98–112)
CLARITY UR: CLEAR
CO2 BLD-SCNC: 27 MMOL/L (ref 21–32)
COLOR UR: YELLOW
CREAT BLD-MCNC: 0.7 MG/DL
EGFRCR SERPLBLD CKD-EPI 2021: 131 ML/MIN/1.73M2 (ref 60–?)
GLUCOSE BLD-MCNC: 117 MG/DL (ref 70–99)
GLUCOSE UR STRIP-MCNC: NEGATIVE MG/DL
HCT VFR BLD AUTO: 43.7 %
HCT VFR BLD CALC: 44 %
HGB BLD-MCNC: 14.5 G/DL
ISTAT IONIZED CALCIUM FOR CHEM 8: 1.22 MMOL/L (ref 1.12–1.32)
LEUKOCYTE ESTERASE UR QL STRIP: NEGATIVE
LYMPHOCYTES # BLD AUTO: 2.2 X10ˆ3/UL (ref 1–4)
LYMPHOCYTES NFR BLD AUTO: 25 %
MCH RBC QN AUTO: 27.9 PG (ref 26–34)
MCHC RBC AUTO-ENTMCNC: 33.2 G/DL (ref 31–37)
MCV RBC AUTO: 84.2 FL (ref 80–100)
MIXED CELL %: 7 %
NEUTROPHILS # BLD AUTO: 5.8 X10ˆ3/UL (ref 1.5–7.7)
NEUTROPHILS NFR BLD AUTO: 68 %
NITRITE UR QL STRIP: NEGATIVE
PH UR STRIP: 6 [PH]
PLATELET # BLD AUTO: 171 X10ˆ3/UL (ref 150–450)
POTASSIUM BLD-SCNC: 3.4 MMOL/L (ref 3.6–5.1)
PROT UR STRIP-MCNC: 30 MG/DL
RBC # BLD AUTO: 5.19 X10ˆ6/UL
SODIUM BLD-SCNC: 141 MMOL/L (ref 136–145)
SP GR UR STRIP: >=1.03
UROBILINOGEN UR STRIP-ACNC: <2 MG/DL
WBC # BLD AUTO: 8.6 X10ˆ3/UL (ref 4–11)

## 2024-09-18 PROCEDURE — 96360 HYDRATION IV INFUSION INIT: CPT | Performed by: NURSE PRACTITIONER

## 2024-09-18 PROCEDURE — 74177 CT ABD & PELVIS W/CONTRAST: CPT | Performed by: NURSE PRACTITIONER

## 2024-09-18 PROCEDURE — 80047 BASIC METABLC PNL IONIZED CA: CPT | Performed by: NURSE PRACTITIONER

## 2024-09-18 PROCEDURE — 85025 COMPLETE CBC W/AUTO DIFF WBC: CPT | Performed by: NURSE PRACTITIONER

## 2024-09-18 PROCEDURE — 81002 URINALYSIS NONAUTO W/O SCOPE: CPT | Performed by: NURSE PRACTITIONER

## 2024-09-18 PROCEDURE — 99214 OFFICE O/P EST MOD 30 MIN: CPT | Performed by: NURSE PRACTITIONER

## 2024-09-18 RX ORDER — SODIUM CHLORIDE 9 MG/ML
1000 INJECTION, SOLUTION INTRAVENOUS ONCE
Status: COMPLETED | OUTPATIENT
Start: 2024-09-18 | End: 2024-09-18

## 2024-09-18 RX ORDER — KETOCONAZOLE 20 MG/G
1 CREAM TOPICAL DAILY
Qty: 30 G | Refills: 0 | Status: SHIPPED | OUTPATIENT
Start: 2024-09-18

## 2024-09-18 RX ORDER — IOHEXOL 350 MG/ML
100 INJECTION, SOLUTION INTRAVENOUS
Status: COMPLETED | OUTPATIENT
Start: 2024-09-18 | End: 2024-09-18

## 2024-09-18 RX ORDER — HYDROCORTISONE ACETATE 25 MG/1
25 SUPPOSITORY RECTAL 2 TIMES DAILY PRN
Qty: 12 SUPPOSITORY | Refills: 0 | Status: SHIPPED | OUTPATIENT
Start: 2024-09-18 | End: 2024-10-18

## 2024-09-18 NOTE — ED PROVIDER NOTES
Patient Seen in: [facility]      History     Chief Complaint   Patient presents with    Abdominal Pain     Stated Complaint: abdominal pain    Subjective:   23 yo male presents with complaint of left side abdominal pain that began 5 hours ago.   Patient endorses that he has had pain on the left side in the past but not like this.  He thought that symptoms would improve after a large, soft bowel movement but they did not.  States today, he had blood in the toilet and on the toilet paper after a bowel movement today; brought pictures revealing bright red blood on tp.  Patient states he has had a colonoscopy and was told he had polyps.   States that he does not drink alcohol on more than a social basis which is about twice a month.  Has smoked marijuana on a daily basis but stopped 1 month ago.    Patient denies fever, chills, sweats, cough, chest pain, nausea, vomiting, watery diarrhea, melena.    The history is provided by the patient.           Objective:   Past Medical History:    Abdominal distention    Abdominal pain    ADD (attention deficit disorder)    Anxiety    Asthma (HCC)    Bad breath    Bipolar affective (HCC)    Black stools    Bloating    Blurred vision    Chest pain    Chest pain on exertion    Chronic cough    Constipation    Decorative tattoo    Depression    has had hospitalization and PHP as well    Developmental delay    Diarrhea, unspecified    Esophageal reflux    Feeling lonely    Headache disorder    Heartburn    Hemorrhoids    High cholesterol    History of depression    History of mental disorder    Hypertriglyceridemia    Indigestion    Irregular bowel habits    Left lower quadrant abdominal tenderness with rebound tenderness    Nausea    Obesity    Oppositional defiant disorder    Plantar fascial fibromatosis    Plantar fasciitis    PTSD (post-traumatic stress disorder)    Shortness of breath    Sleep apnea    will f/u with dr for cpap    Sleep disturbance    Sputum  production    Uncomfortable fullness after meals    Wears glasses    Weight gain              Past Surgical History:   Procedure Laterality Date    Other  2012    urchaes removal in abdomen. He was urinating blood at that time.     Other surgical history N/A 2013    tumor on bladder    Other surgical history      tongue tied-clipped    Tongue and mouth surg unlisted                  Social History     Socioeconomic History    Marital status: Single   Tobacco Use    Smoking status: Former     Types: Cigarettes    Smokeless tobacco: Current   Vaping Use    Vaping status: Every Day    Substances: Nicotine    Devices: Disposable   Substance and Sexual Activity    Alcohol use: Yes     Alcohol/week: 2.0 - 3.0 standard drinks of alcohol     Types: 2 - 3 Standard drinks or equivalent per week     Comment: vodka    Drug use: Not Currently     Types: Cannabis     Social Determinants of Health      Received from [facility], [facility]    Social Connections    Received from [facility], [facility]    Housing Stability              Review of Systems   Constitutional:  Negative for appetite change, chills, diaphoresis and fever.   HENT:  Negative for sore throat and trouble swallowing.    Respiratory:  Negative for cough and shortness of breath.    Gastrointestinal:  Positive for abdominal pain and diarrhea. Negative for nausea and vomiting.   Genitourinary:  Negative for dysuria, flank pain and testicular pain.   Neurological:  Negative for dizziness, weakness and light-headedness.       Positive for stated Chief Complaint: Abdominal Pain    Other systems are as noted in HPI.  Constitutional and vital signs reviewed.      All other systems reviewed and negative except as noted above.    Physical Exam     ED Triage Vitals [09/18/24 1631]   /82   Pulse 89   Resp 18   Temp 98 °F (36.7 °C)   Temp src Temporal   SpO2 98 %   O2 Device None (Room air)        Current Vitals:   Vital Signs  BP: 135/82  Pulse: 89  Resp: 18  Temp: 98 °F (36.7 °C)  Temp src: Temporal    Oxygen Therapy  SpO2: 98 %  O2 Device: None (Room air)            Physical Exam  Constitutional:       General: He is not in acute distress.     Appearance: He is well-developed. He is not ill-appearing, toxic-appearing or diaphoretic.   HENT:      Head: Normocephalic.   Cardiovascular:      Rate and Rhythm: Normal rate and regular rhythm.      Heart sounds: Normal heart sounds.   Pulmonary:      Effort: Pulmonary effort is normal. No respiratory distress.      Breath sounds: Normal breath sounds. No stridor. No wheezing, rhonchi or rales.   Abdominal:      General: Bowel sounds are normal. There is distension. There is no abdominal bruit. There are no signs of injury.      Tenderness: There is abdominal tenderness in the left upper quadrant and left lower quadrant. There is no right CVA tenderness, left CVA tenderness or rebound. Negative signs include Gaston's sign, Rovsing's sign and McBurney's sign.      Hernia: No hernia is present.   Genitourinary:     Rectum: No mass or tenderness (no external hemorrhoid. No blood present.).   Skin:     General: Skin is warm and dry.      Findings: No rash.   Neurological:      Mental Status: He is alert.   Psychiatric:         Mood and Affect: Mood normal.               ED Course     Labs Reviewed   [facility] POCT URINALYSIS DIPSTICK - Abnormal; Notable for the following components:       Result Value    Protein urine 30 (*)     Ketone, Urine Trace (*)     Blood, Urine Small (*)     All other components within normal limits   POCT ISTAT CHEM8 CARTRIDGE - Abnormal; Notable for the following components:    ISTAT Potassium 3.4 (*)     ISTAT Glucose 117 (*)     All other components within normal limits   POCT CBC                      MDM                        Medical Decision Making  25 yo male presents with complaint of left side abdominal pain that began 5 hours ago.    Differential diagnosis includes diverticulitis vs pancreatitis vs IBS vs IBD. Re rectal bleeding considered; anal fissure vs hemorrhoid less likely GI bleed.    Overall, pt is well appearing, with normal vitals, LLQ/LUQ tenderness with deep palpation. No visible or palpable internal or external hemorrhoid.   Pertinent Labs & Imaging studies reviewed. (See chart for details).  CT without acute findings.   Refilled Ketoconazole for hx of candidal intertrigo. Rx Anusol, sitz bath, avoid straining. Push fluids, high fiber diet.   Follow up with PCP in 3 days.   Call to schedule appt with GI.    At this time, I do not believe pt requires and additional diagnostic studies or intervention. I believe pt can be discharged home to continue evaluation as an outpatient.     I discussed the diagnosis and need for followup with their PCP and GI for further evaluation and care, but to go immediately to the ER for worsening, concerning, new, changing or persisting symptoms.  Pt understands and agrees with the plan.        Amount and/or Complexity of Data Reviewed  External Data Reviewed: labs, radiology and notes.  Labs: ordered.  Radiology: ordered.    Risk  OTC drugs.  Prescription drug management.        Disposition and Plan     Clinical Impression:  1. Left-sided abdominal pain of unknown etiology    2. Candidal intertrigo    3. Rectal bleeding         Disposition:  Discharge  9/18/2024  6:28 pm    Follow-up:  Sapphire Mcbride MD  [address] [address] [phone]    In 3 days      Garth Sherman MD  [address] [facility] [address] [phone]    Schedule an appointment as soon as possible for a visit             Medications Prescribed:  Discharge Medication List as of 9/18/2024  6:34 PM        START taking these medications    Details   ketoconazole 2 % External Cream Apply 1 Application topically daily., Normal, Disp-30 g, R-0      hydrocortisone 25 MG Rectal Suppos Place 1 suppository (25 mg  total) rectally 2 (two) times daily as needed for Hemorrhoids., Normal, Disp-12 suppository, R-0

## 2024-09-18 NOTE — DISCHARGE INSTRUCTIONS
Call and schedule appointment with your GI physician.    Anusol; may use twice a day if needed for any rectal bleeding or pain after passing stool. Make sure you drink plenty of fluids to avoid straining or prolonged time on the toilet.     Keep a diary of symptoms.    Drink plenty of fluids.    Follow up with PCP in 3 days.     If any of your symptoms worsen, persist, or new concerning symptoms develop please go directly to the emergency room

## 2024-09-18 NOTE — ED INITIAL ASSESSMENT (HPI)
Pt c/o left sided abd pain \"that I've had for as long as I can remember\"    Pain is becoming worse with palpation    Last BM 9/18 diarrhea blood    Denies: recent constipation, flank pain, issues with urination

## 2024-10-07 ENCOUNTER — PATIENT MESSAGE (OUTPATIENT)
Dept: FAMILY MEDICINE CLINIC | Facility: CLINIC | Age: 24
End: 2024-10-07

## 2024-10-07 NOTE — TELEPHONE ENCOUNTER
From: Jovanny Rowan  To: Carolyn Chaparro  Sent: 10/7/2024 10:31 AM CDT  Subject: Cancel appt    I need to cancel my appt at 12:40pm today, I have gotten called in for work. I apologize and will need to reschedule.

## 2024-10-16 ENCOUNTER — OFFICE VISIT (OUTPATIENT)
Facility: LOCATION | Age: 24
End: 2024-10-16
Payer: COMMERCIAL

## 2024-10-16 DIAGNOSIS — R04.0 EPISTAXIS: Primary | ICD-10-CM

## 2024-10-16 PROCEDURE — 30903 CONTROL OF NOSEBLEED: CPT | Performed by: OTOLARYNGOLOGY

## 2024-10-16 PROCEDURE — 99203 OFFICE O/P NEW LOW 30 MIN: CPT | Performed by: OTOLARYNGOLOGY

## 2024-10-16 NOTE — PROGRESS NOTES
Jovanny Rowan is a 24 year old male. No chief complaint on file.    HPI:   He has a history recurrent epistaxis.  He has had numerous episodes of epistaxis.  There is no history anemia nor bleeding disorder.  Is mostly the left side of his nose.  Current Outpatient Medications   Medication Sig Dispense Refill    ketoconazole 2 % External Cream Apply 1 Application topically daily. 30 g 0    hydrocortisone 25 MG Rectal Suppos Place 1 suppository (25 mg total) rectally 2 (two) times daily as needed for Hemorrhoids. 12 suppository 0      Past Medical History:    Abdominal distention    Abdominal pain    ADD (attention deficit disorder)    Anxiety    Asthma (HCC)    Bad breath    Bipolar affective (HCC)    Black stools    Bloating    Blurred vision    Chest pain    Chest pain on exertion    Chronic cough    Constipation    Decorative tattoo    Depression    has had hospitalization and PHP as well    Developmental delay    Diarrhea, unspecified    Esophageal reflux    Feeling lonely    Headache disorder    Heartburn    Hemorrhoids    High cholesterol    History of depression    History of mental disorder    Hypertriglyceridemia    Indigestion    Irregular bowel habits    Left lower quadrant abdominal tenderness with rebound tenderness    Nausea    Obesity    Oppositional defiant disorder    Plantar fascial fibromatosis    Plantar fasciitis    PTSD (post-traumatic stress disorder)    Shortness of breath    Sleep apnea    will f/u with dr for cpap    Sleep disturbance    Sputum production    Uncomfortable fullness after meals    Wears glasses    Weight gain      Social History:  Social History     Socioeconomic History    Marital status: Single   Tobacco Use    Smoking status: Former     Types: Cigarettes    Smokeless tobacco: Current   Vaping Use    Vaping status: Every Day    Substances: Nicotine    Devices: Disposable   Substance and Sexual Activity    Alcohol use: Yes     Alcohol/week: 2.0 - 3.0 standard drinks of  alcohol     Types: 2 - 3 Standard drinks or equivalent per week     Comment: vodka    Drug use: Not Currently     Types: Cannabis     Social Drivers of Health      Received from [facility], [facility]    Social Connections    Received from [facility], [facility]    Housing Stability      Past Surgical History:   Procedure Laterality Date    Other  2012    urchaes removal in abdomen. He was urinating blood at that time.     Other surgical history N/A 2013    tumor on bladder    Other surgical history      tongue tied-clipped    Tongue and mouth surg unlisted           REVIEW OF SYSTEMS:   GENERAL HEALTH: feels well otherwise  GENERAL : denies fever, chills, sweats, weight loss, weight gain  SKIN: denies any unusual skin lesions or rashes  RESPIRATORY: denies shortness of breath with exertion  NEURO: denies headaches    EXAM:   There were no vitals taken for this visit.    System Findings Details   Constitutional  Overall appearance - Normal.   Psychiatric  Orientation - Oriented to time, place, person & situation. Appropriate mood and affect.   Head/Face  Facial features -- Normal. Skull - Normal.   Eyes  Pupils equal ,round ,react to light and accomidate   Ears, Nose, Throat, Neck  Mild septal deviation left.  There is a few prominent vessels at the left nasal septum oropharynx clear neck no masses   Neurological  Memory - Normal. Cranial nerves - Cranial nerves II through XII grossly intact.   Lymph Detail  Submental. Submandibular. Anterior cervical. Posterior cervical. Supraclavicular.   The nose was anesthetized with topical lidocaine.  Silver nitrate cautery was performed to of few bleeding vessels.  The patient tolerated procedure well.    ASSESSMENT AND PLAN:   1. Epistaxis  Status post cautery on the left.  Epistaxis information sheet was given.  He will use nasal saline and see me back as needed.      The patient indicates  understanding of these issues and agrees to the plan.    No follow-ups on file.    Adalid Paul MD  10/16/2024  9:05 AM

## 2024-11-01 ENCOUNTER — TELEPHONE (OUTPATIENT)
Age: 24
End: 2024-11-01

## 2024-11-01 NOTE — TELEPHONE ENCOUNTER
[facility]  [address] [facility] [address]  Phone: [phone]  [email or web address]     [facility]  [address]  [facility] [address]  Phone: [phone]  [email or web address]     [facility]  [address] [address]  Phone: [phone]  [email or web address]

## 2024-12-22 ENCOUNTER — HOSPITAL ENCOUNTER (EMERGENCY)
Age: 24
Discharge: HOME | End: 2024-12-22
Attending: EMERGENCY MEDICINE
Payer: COMMERCIAL

## 2024-12-22 VITALS
HEIGHT: 65 IN | TEMPERATURE: 98 F | WEIGHT: 226 LBS | BODY MASS INDEX: 37.65 KG/M2 | HEART RATE: 91 BPM | DIASTOLIC BLOOD PRESSURE: 74 MMHG | RESPIRATION RATE: 20 BRPM | OXYGEN SATURATION: 95 % | SYSTOLIC BLOOD PRESSURE: 118 MMHG

## 2024-12-22 DIAGNOSIS — J10.1 INFLUENZA B: Primary | ICD-10-CM

## 2024-12-22 LAB
POCT INFLUENZA A: NEGATIVE
POCT INFLUENZA B: POSITIVE
SARS-COV-2 RNA RESP QL NAA+PROBE: NOT DETECTED

## 2024-12-22 PROCEDURE — 87502 INFLUENZA DNA AMP PROBE: CPT | Performed by: EMERGENCY MEDICINE

## 2024-12-22 PROCEDURE — 99283 EMERGENCY DEPT VISIT LOW MDM: CPT

## 2024-12-22 PROCEDURE — 87502 INFLUENZA DNA AMP PROBE: CPT

## 2024-12-22 RX ORDER — BALOXAVIR MARBOXIL 80 MG/1
1 TABLET, FILM COATED ORAL ONCE
Qty: 1 EACH | Refills: 0 | Status: SHIPPED | OUTPATIENT
Start: 2024-12-22 | End: 2024-12-22

## 2024-12-22 RX ORDER — OSELTAMIVIR PHOSPHATE 75 MG/1
75 CAPSULE ORAL 2 TIMES DAILY
Qty: 10 CAPSULE | Refills: 0 | Status: SHIPPED | OUTPATIENT
Start: 2024-12-22 | End: 2024-12-27

## 2024-12-23 NOTE — ED PROVIDER NOTES
Patient Seen in: [facility] [place]      History     Chief Complaint   Patient presents with    Cough/URI     Stated Complaint: cough, abd pain    Subjective:   24-year-old male presents for fever body aches and cough.  Patient states his brother is positive for the flu.  Denies any shortness of breath denies chest pain        Objective:     Past Medical History:    Abdominal distention    Abdominal pain    ADD (attention deficit disorder)    Anxiety    Asthma (HCC)    Bad breath    Bipolar affective (HCC)    Black stools    Bloating    Blurred vision    Chest pain    Chest pain on exertion    Chronic cough    Constipation    Decorative tattoo    Depression    has had hospitalization and PHP as well    Developmental delay    Diarrhea, unspecified    Esophageal reflux    Feeling lonely    Headache disorder    Heartburn    Hemorrhoids    High cholesterol    History of depression    History of mental disorder    Hypertriglyceridemia    Indigestion    Irregular bowel habits    Left lower quadrant abdominal tenderness with rebound tenderness    Nausea    Obesity    Oppositional defiant disorder    Plantar fascial fibromatosis    Plantar fasciitis    PTSD (post-traumatic stress disorder)    Shortness of breath    Sleep apnea    will f/u with dr for cpap    Sleep disturbance    Sputum production    Uncomfortable fullness after meals    Wears glasses    Weight gain              Past Surgical History:   Procedure Laterality Date    Other  2012    urchaes removal in abdomen. He was urinating blood at that time.     Other surgical history N/A 2013    tumor on bladder    Other surgical history      tongue tied-clipped    Tongue and mouth surg unlisted                  Social History     Socioeconomic History    Marital status: Single   Tobacco Use    Smoking status: Former     Types: Cigarettes    Smokeless tobacco: Current   Vaping Use    Vaping status: Every Day    Substances: Nicotine    Devices:  Disposable   Substance and Sexual Activity    Alcohol use: Yes     Alcohol/week: 2.0 - 3.0 standard drinks of alcohol     Types: 2 - 3 Standard drinks or equivalent per week     Comment: vodka    Drug use: Yes     Types: Cannabis     Social Drivers of Health      Received [facility], [facility]    Social Connections    [facility] from [facility], [facility]    Housing Stability                  Physical Exam     ED Triage Vitals   BP 12/22/24 1646 124/74   Pulse 12/22/24 1646 82   Resp 12/22/24 1646 20   Temp 12/22/24 1646 98.5 °F (36.9 °C)   Temp src 12/22/24 1646 Temporal   SpO2 12/22/24 1646 95 %   O2 Device 12/22/24 1750 None (Room air)       Current Vitals:   Vital Signs  BP: 118/74  Pulse: 91  Resp: 20  Temp: 98.2 °F (36.8 °C)  Temp src: Oral    Oxygen Therapy  SpO2: 95 %  O2 Device: None (Room air)        Physical Exam  Vitals and nursing note reviewed.   Constitutional:       General: He is not in acute distress.  HENT:      Head: Normocephalic.      Nose: Congestion present.   Cardiovascular:      Rate and Rhythm: Normal rate.   Pulmonary:      Effort: Pulmonary effort is normal. No respiratory distress.      Breath sounds: Normal breath sounds. No wheezing.   Musculoskeletal:         General: Normal range of motion.   Skin:     General: Skin is warm and dry.   Neurological:      General: No focal deficit present.      Mental Status: He is alert and oriented to person, place, and time.           ED Course     Labs Reviewed   POCT FLU TEST - Abnormal; Notable for the following components:       Result Value    POCT INFLUENZA B Positive (*)     All other components within normal limits    Narrative:     This assay is a rapid molecular in vitro test utilizing nucleic acid amplification of influenza A and B viral RNA.   RAPID SARS-COV-2 BY PCR - Normal        MDM      Medical Decision Making  Pertinent Labs & Imaging studies  reviewed. (See chart for details).  Patient coming in with cough, congestion, exposure to influenza.   Differential diagnosis includes viral URI, flu  Will discharge on Tamiflu, paper prescription for Xofluza given depending on insurance coverage.   Patient is comfortable with this plan.     Overall Pt looks good. Non-toxic, well-hydrated and in no respiratory distress. Vital signs are reassuring. Exam is reassuring. I do not believe pt requires and additional diagnostic studies or intervention. I believe pt can be discharged home to continue evaluation as an outpatient. Follow-up provider given. Discharge instructions given and reviewed. Return for any problems. All understand and agrees with the plan.        Problems Addressed:  Influenza B: acute illness or injury    Amount and/or Complexity of Data Reviewed  Labs: ordered. Decision-making details documented in ED Course.    Risk  OTC drugs.  Prescription drug management.        Disposition and Plan     Clinical Impression:  1. Influenza B         Disposition:  Discharge  12/22/2024  6:00 pm    Follow-up:  No follow-up provider specified.        Medications Prescribed:  Discharge Medication List as of 12/22/2024  6:02 PM        START taking these medications    Details   oseltamivir 75 MG Oral Cap Take 1 capsule (75 mg total) by mouth 2 (two) times daily for 5 days., Normal, Disp-10 capsule, R-0      Baloxavir Marboxil,80 MG Dose, (XOFLUZA, 80 MG DOSE,) 1 x 80 MG Oral Tablet Therapy Pack Take 1 Package by mouth one time for 1 dose., Print, Disp-1 each, R-0                 Supplementary Documentation:

## 2024-12-30 ENCOUNTER — PATIENT OUTREACH (OUTPATIENT)
Dept: CASE MANAGEMENT | Age: 24
End: 2024-12-30

## 2024-12-30 NOTE — PROGRESS NOTES
ED follow up.    Sapphire Mcbride M.D.  [facility]  [address]  [place] [address]  [phone]    Patient does not wish to follow up.    Confirmed with patient.    Closing encounter.

## 2025-03-26 ENCOUNTER — LAB ENCOUNTER (OUTPATIENT)
Dept: LAB | Age: 25
End: 2025-03-26
Attending: NURSE PRACTITIONER
Payer: COMMERCIAL

## 2025-03-26 ENCOUNTER — TELEPHONE (OUTPATIENT)
Dept: FAMILY MEDICINE CLINIC | Facility: CLINIC | Age: 25
End: 2025-03-26

## 2025-03-26 ENCOUNTER — OFFICE VISIT (OUTPATIENT)
Dept: FAMILY MEDICINE CLINIC | Facility: CLINIC | Age: 25
End: 2025-03-26
Payer: COMMERCIAL

## 2025-03-26 VITALS
DIASTOLIC BLOOD PRESSURE: 72 MMHG | SYSTOLIC BLOOD PRESSURE: 114 MMHG | BODY MASS INDEX: 37.99 KG/M2 | WEIGHT: 228 LBS | HEART RATE: 88 BPM | OXYGEN SATURATION: 98 % | HEIGHT: 65 IN | TEMPERATURE: 97 F

## 2025-03-26 DIAGNOSIS — E55.9 HYPOVITAMINOSIS D: ICD-10-CM

## 2025-03-26 DIAGNOSIS — E87.6 LOW SERUM POTASSIUM: ICD-10-CM

## 2025-03-26 DIAGNOSIS — E78.1 HYPERTRIGLYCERIDEMIA: ICD-10-CM

## 2025-03-26 DIAGNOSIS — E66.9 OBESITY (BMI 30-39.9): ICD-10-CM

## 2025-03-26 DIAGNOSIS — J45.20 MILD INTERMITTENT ASTHMA WITHOUT COMPLICATION (HCC): ICD-10-CM

## 2025-03-26 DIAGNOSIS — R22.32 LUMP IN ARMPIT, LEFT: Primary | ICD-10-CM

## 2025-03-26 DIAGNOSIS — R22.32 LUMP IN ARMPIT, LEFT: ICD-10-CM

## 2025-03-26 DIAGNOSIS — F12.21 CANNABIS USE DISORDER, MODERATE, IN EARLY REMISSION (HCC): ICD-10-CM

## 2025-03-26 DIAGNOSIS — R79.89 ELEVATED LFTS: Primary | ICD-10-CM

## 2025-03-26 DIAGNOSIS — E03.9 HYPOTHYROIDISM, UNSPECIFIED TYPE: ICD-10-CM

## 2025-03-26 DIAGNOSIS — F31.61 BIPOLAR DISORDER, CURRENT EPISODE MIXED, MILD (HCC): ICD-10-CM

## 2025-03-26 LAB
ALBUMIN SERPL-MCNC: 5.2 G/DL (ref 3.2–4.8)
ALBUMIN/GLOB SERPL: 1.6 {RATIO} (ref 1–2)
ALP LIVER SERPL-CCNC: 126 U/L
ALT SERPL-CCNC: 85 U/L
ANION GAP SERPL CALC-SCNC: 12 MMOL/L (ref 0–18)
AST SERPL-CCNC: 47 U/L (ref ?–34)
BASOPHILS # BLD AUTO: 0.05 X10(3) UL (ref 0–0.2)
BASOPHILS NFR BLD AUTO: 0.6 %
BILIRUB SERPL-MCNC: 0.8 MG/DL (ref 0.3–1.2)
BUN BLD-MCNC: 7 MG/DL (ref 9–23)
CALCIUM BLD-MCNC: 10 MG/DL (ref 8.7–10.6)
CHLORIDE SERPL-SCNC: 102 MMOL/L (ref 98–112)
CHOLEST SERPL-MCNC: 173 MG/DL (ref ?–200)
CO2 SERPL-SCNC: 25 MMOL/L (ref 21–32)
CREAT BLD-MCNC: 0.81 MG/DL
EGFRCR SERPLBLD CKD-EPI 2021: 125 ML/MIN/1.73M2 (ref 60–?)
EOSINOPHIL # BLD AUTO: 0.1 X10(3) UL (ref 0–0.7)
EOSINOPHIL NFR BLD AUTO: 1.2 %
ERYTHROCYTE [DISTWIDTH] IN BLOOD BY AUTOMATED COUNT: 13.1 %
EST. AVERAGE GLUCOSE BLD GHB EST-MCNC: 114 MG/DL (ref 68–126)
FASTING PATIENT LIPID ANSWER: YES
FASTING STATUS PATIENT QL REPORTED: YES
GLOBULIN PLAS-MCNC: 3.3 G/DL (ref 2–3.5)
GLUCOSE BLD-MCNC: 102 MG/DL (ref 70–99)
HBA1C MFR BLD: 5.6 % (ref ?–5.7)
HCT VFR BLD AUTO: 45.8 %
HDLC SERPL-MCNC: 32 MG/DL (ref 40–59)
HGB BLD-MCNC: 15.6 G/DL
IMM GRANULOCYTES # BLD AUTO: 0.07 X10(3) UL (ref 0–1)
IMM GRANULOCYTES NFR BLD: 0.9 %
LDLC SERPL CALC-MCNC: 72 MG/DL (ref ?–100)
LYMPHOCYTES # BLD AUTO: 2.34 X10(3) UL (ref 1–4)
LYMPHOCYTES NFR BLD AUTO: 28.9 %
MCH RBC QN AUTO: 28.6 PG (ref 26–34)
MCHC RBC AUTO-ENTMCNC: 34.1 G/DL (ref 31–37)
MCV RBC AUTO: 83.9 FL
MONOCYTES # BLD AUTO: 0.59 X10(3) UL (ref 0.1–1)
MONOCYTES NFR BLD AUTO: 7.3 %
NEUTROPHILS # BLD AUTO: 4.96 X10 (3) UL (ref 1.5–7.7)
NEUTROPHILS # BLD AUTO: 4.96 X10(3) UL (ref 1.5–7.7)
NEUTROPHILS NFR BLD AUTO: 61.1 %
NONHDLC SERPL-MCNC: 141 MG/DL (ref ?–130)
OSMOLALITY SERPL CALC.SUM OF ELEC: 286 MOSM/KG (ref 275–295)
PLATELET # BLD AUTO: 190 10(3)UL (ref 150–450)
POTASSIUM SERPL-SCNC: 3.3 MMOL/L (ref 3.5–5.1)
PROT SERPL-MCNC: 8.5 G/DL (ref 5.7–8.2)
RBC # BLD AUTO: 5.46 X10(6)UL
SODIUM SERPL-SCNC: 139 MMOL/L (ref 136–145)
T4 FREE SERPL-MCNC: 1.1 NG/DL (ref 0.8–1.7)
TRIGL SERPL-MCNC: 439 MG/DL (ref 30–149)
TSI SER-ACNC: 5.54 UIU/ML (ref 0.55–4.78)
VIT D+METAB SERPL-MCNC: 7.7 NG/ML (ref 30–100)
VLDLC SERPL CALC-MCNC: 68 MG/DL (ref 0–30)
WBC # BLD AUTO: 8.1 X10(3) UL (ref 4–11)

## 2025-03-26 PROCEDURE — 84443 ASSAY THYROID STIM HORMONE: CPT

## 2025-03-26 PROCEDURE — 80061 LIPID PANEL: CPT

## 2025-03-26 PROCEDURE — 82306 VITAMIN D 25 HYDROXY: CPT

## 2025-03-26 PROCEDURE — 3078F DIAST BP <80 MM HG: CPT | Performed by: NURSE PRACTITIONER

## 2025-03-26 PROCEDURE — 83036 HEMOGLOBIN GLYCOSYLATED A1C: CPT

## 2025-03-26 PROCEDURE — G2211 COMPLEX E/M VISIT ADD ON: HCPCS | Performed by: NURSE PRACTITIONER

## 2025-03-26 PROCEDURE — 36415 COLL VENOUS BLD VENIPUNCTURE: CPT

## 2025-03-26 PROCEDURE — 3074F SYST BP LT 130 MM HG: CPT | Performed by: NURSE PRACTITIONER

## 2025-03-26 PROCEDURE — 99214 OFFICE O/P EST MOD 30 MIN: CPT | Performed by: NURSE PRACTITIONER

## 2025-03-26 PROCEDURE — 85025 COMPLETE CBC W/AUTO DIFF WBC: CPT

## 2025-03-26 PROCEDURE — 84439 ASSAY OF FREE THYROXINE: CPT

## 2025-03-26 PROCEDURE — 3008F BODY MASS INDEX DOCD: CPT | Performed by: NURSE PRACTITIONER

## 2025-03-26 PROCEDURE — 80053 COMPREHEN METABOLIC PANEL: CPT

## 2025-03-26 RX ORDER — ERGOCALCIFEROL 1.25 MG/1
50000 CAPSULE, LIQUID FILLED ORAL WEEKLY
Qty: 12 CAPSULE | Refills: 1 | Status: SHIPPED | OUTPATIENT
Start: 2025-03-26 | End: 2025-06-24

## 2025-03-26 RX ORDER — CLINDAMYCIN PHOSPHATE 11.9 MG/ML
1 SOLUTION TOPICAL 2 TIMES DAILY
Qty: 60 ML | Refills: 0 | Status: SHIPPED | OUTPATIENT
Start: 2025-03-26 | End: 2025-04-25

## 2025-03-26 NOTE — PROGRESS NOTES
HPI:     Patients presents today with complaints of a \"lump underneath left armpit.\"   Patient reports noticing the lump about 1.5 months ago. Patient reports it \"originally started the size of a pea and now is a quarter.\" He reports it being hard. Patient reports going to the ER yesterday morning, but left because the \"wait was too long.\"    Family History   Problem Relation Age of Onset    Cancer Father         Testicular cancer    Alcohol and Other Disorders Associated Father     Anxiety Mother     Depression Mother     Psychiatric Mother         postpartum depression    OCD Mother     ADHD Mother         undiagnosed    PTSD Mother     Anxiety Maternal Grandmother     Depression Maternal Grandmother     Bipolar Disorder Maternal Grandmother         undiagnosed    Diabetes Maternal Grandmother     Depression Maternal Grandfather     Anxiety Maternal Grandfather     Alcohol and Other Disorders Associated Maternal Grandfather     Hypertension Maternal Grandfather     Anxiety Sister     PTSD Paternal Grandfather     Alcohol and Other Disorders Associated Paternal Grandfather     Hypertension Paternal Grandfather     Obesity Paternal Grandfather     Anxiety Maternal Cousin Female     Anxiety Maternal Aunt     Anxiety Paternal Uncle     Alcohol and Other Disorders Associated Paternal Uncle     Substance Abuse Paternal Uncle     Diabetes Paternal Grandmother       Social History     Socioeconomic History    Marital status: Single   Tobacco Use    Smoking status: Former     Types: Cigarettes    Smokeless tobacco: Current   Vaping Use    Vaping status: Every Day    Substances: Nicotine    Devices: Disposable   Substance and Sexual Activity    Alcohol use: Yes     Alcohol/week: 2.0 - 3.0 standard drinks of alcohol     Types: 2 - 3 Standard drinks or equivalent per week     Comment: vodka    Drug use: Yes     Types: Cannabis     Social Drivers of Health      Received from [facility], [facility]  [facility]    Housing Stability         REVIEW OF SYSTEMS:   Review of Systems   Constitutional:  Negative for chills, diaphoresis and fatigue.   HENT:  Negative for congestion, postnasal drip, rhinorrhea, sinus pressure and sinus pain.    Eyes:  Negative for pain, redness and itching.   Respiratory:  Negative for cough, choking, chest tightness and shortness of breath.    Cardiovascular:  Negative for chest pain and palpitations.   Gastrointestinal:  Negative for abdominal distention, abdominal pain, diarrhea, nausea and vomiting.   Genitourinary:  Negative for flank pain, frequency and urgency.   Musculoskeletal:  Negative for joint swelling and neck pain.   Skin:  Positive for color change.        Lump under left armpit appears to have slight erythema   Neurological:  Negative for dizziness, seizures, light-headedness, numbness and headaches.   Psychiatric/Behavioral:  Negative for behavioral problems, confusion, sleep disturbance and suicidal ideas. The patient is not nervous/anxious.        EXAM:   /72   Pulse 88   Temp 97.3 °F (36.3 °C)   Ht 5' 5\" (1.651 m)   Wt 228 lb (103.4 kg)   SpO2 98%   BMI 37.94 kg/m²   Physical Exam  Constitutional:       General: He is not in acute distress.     Appearance: He is obese.   Neurological:      Mental Status: He is alert.   Psychiatric:         Mood and Affect: Mood normal.         Behavior: Behavior normal.         ASSESSMENT AND PLAN:   Diagnoses and all orders for this visit:    Lump in armpit, left  Comments:  possible hidradenitis suppurativa. Will check labs and u/s. Start topical treatment and derm referral placed  Orders:  -     CBC W Differential W Platelet [E]; Future  -     US AXILLARY LEFT (CPT=76882); Future  -     clindamycin 1 % External Solution; Apply 1 Application topically 2 (two) times daily.  -     Derm Referral - External    Mild intermittent asthma without complication (HCC)  Comments:  no recent issues. Has not needed inhaler in  long time    Bipolar disorder, current episode mixed, mild (HCC)  Comments:  Not currently medicated or seeing psych. Feels well    Hypertriglyceridemia  Comments:  check lab work and will send to cardiology  Orders:  -     Comp Metabolic Panel (14) [E]; Future  -     Lipid Panel [E]; Future  -     Hemoglobin A1C [E]; Future    Obesity (BMI 30-39.9)  -     Comp Metabolic Panel (14) [E]; Future  -     Hemoglobin A1C [E]; Future    Hypovitaminosis D  -     TSH and Free T4 [E]; Future  -     Vitamin D [E]; Future    Cannabis use disorder, moderate, in early remission (HCC)      Labs ordered. Dermatology referral provided.     Zahira EDENS

## 2025-03-26 NOTE — TELEPHONE ENCOUNTER
----- Message from Carolyn Chaparro sent at 3/26/2025  1:09 PM CDT -----  Results reviewed.   Potassium mildly low. Increase potassium-rich foods. Recheck potassium in 2 weeks  Liver enzymes elevated. Recommend abdominal ultrasound  Triglycerides elevated, please forward all results to cardiology. Needs to follow up with Dr. Suazo  TSH elevated. Does he take any vitamins?  Vitamin D very low. Will send prescription  No diabetes or prediabetes  No anemia

## 2025-03-27 RX ORDER — LEVOTHYROXINE SODIUM 50 UG/1
50 TABLET ORAL
Qty: 90 TABLET | Refills: 0 | Status: SHIPPED | OUTPATIENT
Start: 2025-03-27 | End: 2025-06-25

## 2025-03-27 NOTE — TELEPHONE ENCOUNTER
I don’t take vitamins or take supplements. What do you recommend I take?    Please schedule patient for labs before tx next week  Thank you!

## 2025-03-27 NOTE — TELEPHONE ENCOUNTER
TSH was elevated, which can be impacted by vitamins  I would recommend starting thyroid medication & recheck TSH in 6 weeks  Daily multivitamin not bad idea

## 2025-04-09 ENCOUNTER — TELEPHONE (OUTPATIENT)
Dept: FAMILY MEDICINE CLINIC | Facility: CLINIC | Age: 25
End: 2025-04-09

## 2025-04-25 ENCOUNTER — TELEPHONE (OUTPATIENT)
Dept: FAMILY MEDICINE CLINIC | Facility: CLINIC | Age: 25
End: 2025-04-25

## (undated) NOTE — LETTER
Date & Time: 12/20/2023, 5:22 PM  Patient: Chaim Link  Encounter Provider(s):    HAMZAH Ruiz       To Whom It May Concern:    Ashley Peters was seen and treated in our department on 12/20/2023. He may return to work 12/25/2023 wearing a mask until 12/31/2023. Mr. Tien Breen may work from home from 12/21/2023-12/25/2023.     If you have any questions or concerns, please do not hesitate to call.        _____________________________  Physician/APC Signature

## (undated) NOTE — LETTER
Date & Time: 12/8/2023, 3:59 PM  Patient: Folrinda Quick  Encounter Provider(s):    Vikki Alpers, APRN       To Whom It May Concern:    Johnnie Lal was seen and treated in our department on 12/8/2023. He may return to work with restrictions of no machinery work or use of the safety belt for 1 week.     If you have any questions or concerns, please do not hesitate to call.        _____________________________  Physician/APC Signature

## (undated) NOTE — LETTER
[facility]  [address]  [address] [address]  Dept: [phone]  Dept Fax: [phone]      December 18, 2017    Patient: Toro Rios   Date of Visit: 12/18/2017       To Whom It May Concern:    Williams Munoz was seen and song

## (undated) NOTE — LETTER
Date & Time: 8/23/2021, 6:21 PM  Patient: Eyad Meza  Encounter Provider(s):    America Biggs MD       To Whom It May Concern:    Hillary Mas was seen and treated in our department on 8/23/2021. Please excuse Andrés Aguilar from work 8/23/2021.     If y

## (undated) NOTE — LETTER
ASTHMA ACTION PLAN for Samira Mendez     : 2000     Date: 21  Doctor:  Nicolle Cherry MD  Phone for doctor or clinic: [address]  [address] [place] [address]  [address] [employer] [address] [phone]      ACT Score: 21    A the emergency room or call 911 immediately! If symptoms improve, call office for appointment immediately.     Albuterol inhaler 2 puffs every 20 minutes for three treatments       Don't forget:  · Rinse mouth after using inhaler  · Use spacer for inhaler  ·

## (undated) NOTE — LETTER
04/27/19        Lisa Issa  [address] [address]      Dear Lottie Blanco records indicate that you have outstanding testing that was ordered for you and has not yet been completed:           US ABDOMEN COMPLETE (CPT=76700)    To prov

## (undated) NOTE — LETTER
Date & Time: 9/18/2024, 6:42 PM  Patient: Jovanny Rowan  Encounter Provider(s):    Ivania Rosales APRN       To Whom It May Concern:    Jovanny Rowan was seen and treated in our department on 9/18/2024. He {Return to school/sport/work:[id number]}.    If you have any questions or concerns, please do not hesitate to call.        _____________________________  Physician/APC Signature

## (undated) NOTE — LETTER
[facility] [place]  [address] [address]  Dept: [phone]  Dept Fax: [phone]      December 18, 2017    Patient: Beth Castillo   Date of Visit: 12/18/2017       To Whom It May Concern:    Pablo Alves was seen and song

## (undated) NOTE — LETTER
ASTHMA ACTION PLAN for Jovanny Rowan     : 2000     Date: 07/15/24  Doctor:  HAMZAH Rucker  Phone for doctor or clinic: [facility], [place] [address], [place] [id number] [address] [address] [phone]      ACT Score: 17    ACT Goal: 20 or greater    Call your provider if you require your rescue/quick reliever medication more than 2-3 times in a 24 hour period.    If you require your rescue inhaler/medication more than 2-3 times weekly, your asthma may not be under proper control and you should seek medical attention.    *Quick Relievers are Xopenex and Albuterol*    You can use the colors of a traffic light to help learn about your asthma medicines.  Year Round       1. Green - Go! % of Personal Best Peak Flow   Use controller medicine.   Breathing is good  No cough or wheeze  Can work and play Medicine How much to take When to take it    Medications       Sympathomimetics Instructions     albuterol 108 (90 Base) MCG/ACT Inhalation Aero Soln Inhale 1-2 puffs into the lungs every 4 (four) hours as needed for Wheezing or Shortness of Breath.                    2. Yellow - Caution. 50-79% Personal Best Peak Flow  Use reliever medicine to keep an asthma attack from getting bad.   Cough  Quick Relievers  Wheezing  Tight Chest  Wake up at night Medicine How much to take When to take it    If symptoms are not improving in 24-48 hrs, call office for further instructions  Medications       Sympathomimetics Instructions     albuterol 108 (90 Base) MCG/ACT Inhalation Aero Soln Inhale 1-2 puffs into the lungs every 4 (four) hours as needed for Wheezing or Shortness of Breath.                    3. Red - Stop! Danger! <50% Personal Best Peak Flow  Continue Controller Medications But ADD:   Medicine not helping  Breathing is hard and fast  Nose opens wide  Can't walk  Ribs show  Can't talk well Medicine How much to take When to take it    If your symptoms do not improve in  ONE hour -  go to the emergency room or call 911 immediately! If symptoms improve, call office for appointment immediately.    Albuterol inhaler 2 puffs every 20 minutes for three treatments       Don't forget:  Rinse mouth after using inhaler  Use spacer for inhaler  Remember to get your Flu vaccine every fall!    [x] Asthma Action Plan reviewed with the caregiver and patient, and a copy of the plan was given to the patient/caregiver.   [] Asthma Action Plan reviewed with the caregiver and patient on the phone, and copy mailed to patient/caregiver or sent via [place].     Signatures:   Provider  HAMZAH Rucker Patient  Jovanny A Anum Caretaker

## (undated) NOTE — LETTER
Date & Time: 6/16/2024, 10:28 AM  Patient: Jovanny Rowan  Encounter Provider(s):    Mundo Boucher PA-C       To Whom It May Concern:    Jovanny Rowan was seen and treated in our department on 6/16/2024. He should not return to work until 6/17/24 .    If you have any questions or concerns, please do not hesitate to call.        _____________________________  Physician/APC Signature

## (undated) NOTE — LETTER
7/23/2019          To Whom It May Concern:    Emile Duffy is currently under my medical care and may not return to work at this time. Please excuse Nolan Hill for one week. He may return to work on Tuesday, July 30, 2019.     If you require additional inf

## (undated) NOTE — LETTER
Date & Time: 9/18/2024, 5:51 PM  Patient: Jovanny Rowan  Encounter Provider(s):    Ivania Rosales APRN       To Whom It May Concern:    Jovanny Rowan was seen and treated in our department on 9/18/2024. He can return to work when scheduled.    If you have any questions or concerns, please do not hesitate to call.        _____________________________  Physician/APC Signature

## (undated) NOTE — LETTER
Date: 10/12/2017    Patient Name: Araceli Olmos          To Whom it may concern: The above patient was seen at the [facility] for treatment of a medical condition. This patient should be excused from attending school from 10/12/17 .

## (undated) NOTE — LETTER
Date & Time: 11/12/2021, 10:55 AM  Patient: Ely Silva  Encounter Provider(s):    HAMZAH Graham       To Whom It May Concern:    Pancho Verma was seen and treated in our department on 11/12/2021. He can return to work on 11/13/2021.

## (undated) NOTE — LETTER
Date & Time: 12/20/2023, 5:19 PM  Patient: Felix Humphrey  Encounter Provider(s):    HAMZAH Peterson       To Whom It May Concern:    Jung Tong was seen and treated in our department on 12/20/2023. He should not return to work until 12/26/23 .     If you have any questions or concerns, please do not hesitate to call.        _____________________________  Physician/APC Signature

## (undated) NOTE — LETTER
[address] [place] [address]  Dept: [phone]  Dept Fax: [phone]         September 8, 2019    Patient: Jame Ramirez   YOB: 2000   Date of Visit: 9/8/2019       To Whom It May Concern:     Mar

## (undated) NOTE — LETTER
Date & Time: 5/22/2024, 8:56 AM  Patient: Jovanny Rowan  Encounter Provider(s):    Melisa Rod APRN       To Whom It May Concern:    Jovanny Rowan was seen and treated in our department on 5/22/2024. He should not return to work until 5/23/24 .    If you have any questions or concerns, please do not hesitate to call.        _____________________________  Physician/APC Signature

## (undated) NOTE — LETTER
Date & Time: 10/10/2019, 9:12 AM  Patient: Jovita King  Encounter Provider(s):    Jazmín Haas MD       To Whom It May Concern:    Jovita King was seen and treated in our department on 10/10/2019. He should not return to work until 10/11/19.

## (undated) NOTE — LETTER
Date: 1/26/2018    Patient Name: Kristine Manrique          To Whom it may concern:    I agree with the physchiatrist's, Dr. Ashley Neely, care plan regarding the above patient.        Sincerely,        Graciela Harrison, DO